# Patient Record
Sex: FEMALE | Race: ASIAN | NOT HISPANIC OR LATINO
[De-identification: names, ages, dates, MRNs, and addresses within clinical notes are randomized per-mention and may not be internally consistent; named-entity substitution may affect disease eponyms.]

---
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
## 2017-09-13 ENCOUNTER — CHARTING TRANS (OUTPATIENT)
Dept: OTHER | Age: 67
End: 2017-09-13

## 2017-12-11 ENCOUNTER — LAB SERVICES (OUTPATIENT)
Dept: OTHER | Age: 67
End: 2017-12-11

## 2017-12-12 ENCOUNTER — CHARTING TRANS (OUTPATIENT)
Dept: OTHER | Age: 67
End: 2017-12-12

## 2017-12-12 LAB
ALBUMIN SERPL-MCNC: 4.4 G/DL (ref 3.6–5.1)
ALBUMIN/GLOB SERPL: 1.2 (ref 1–2.4)
ALP SERPL-CCNC: 66 UNITS/L (ref 45–117)
ALT SERPL-CCNC: 22 UNITS/L
ANION GAP SERPL CALC-SCNC: 18 MMOL/L (ref 10–20)
AST SERPL-CCNC: 23 UNITS/L
BASOPHILS # BLD: 0 K/MCL (ref 0–0.3)
BASOPHILS NFR BLD: 0 %
BILIRUB SERPL-MCNC: 0.3 MG/DL (ref 0.2–1)
BUN SERPL-MCNC: 12 MG/DL (ref 6–20)
BUN/CREAT SERPL: 15 (ref 7–25)
CALCIUM SERPL-MCNC: 9.5 MG/DL (ref 8.4–10.2)
CHLORIDE SERPL-SCNC: 103 MMOL/L (ref 98–107)
CHOLEST SERPL-MCNC: 163 MG/DL
CHOLEST/HDLC SERPL: 1.9
CO2 SERPL-SCNC: 25 MMOL/L (ref 21–32)
CREAT SERPL-MCNC: 0.79 MG/DL (ref 0.51–0.95)
DIFFERENTIAL METHOD BLD: ABNORMAL
EOSINOPHIL # BLD: 0.3 K/MCL (ref 0.1–0.5)
EOSINOPHIL NFR BLD: 4 %
ERYTHROCYTE [DISTWIDTH] IN BLOOD: 14 % (ref 11–15)
GLOBULIN SER-MCNC: 3.8 G/DL (ref 2–4)
GLUCOSE SERPL-MCNC: 126 MG/DL (ref 65–99)
HBA1C MFR BLD: 7.6 % (ref 4.5–5.6)
HDLC SERPL-MCNC: 84 MG/DL
HEMATOCRIT: 42.1 % (ref 36–46.5)
HEMOGLOBIN: 12.8 G/DL (ref 12–15.5)
LDLC SERPL CALC-MCNC: 45 MG/DL
LENGTH OF FAST TIME PATIENT: ABNORMAL HRS
LENGTH OF FAST TIME PATIENT: ABNORMAL HRS
LYMPHOCYTES # BLD: 2.6 K/MCL (ref 1–4)
LYMPHOCYTES NFR BLD: 33 %
MEAN CORPUSCULAR HEMOGLOBIN: 27.1 PG (ref 26–34)
MEAN CORPUSCULAR HGB CONC: 30.4 G/DL (ref 32–36.5)
MEAN CORPUSCULAR VOLUME: 89.2 FL (ref 78–100)
MONOCYTES # BLD: 0.5 K/MCL (ref 0.3–0.9)
MONOCYTES NFR BLD: 6 %
NEUTROPHILS # BLD: 4.5 K/MCL (ref 1.8–7.7)
NEUTROPHILS NFR BLD: 57 %
NONHDLC SERPL-MCNC: 79 MG/DL
PLATELET COUNT: 263 K/MCL (ref 140–450)
POTASSIUM SERPL-SCNC: 4.5 MMOL/L (ref 3.4–5.1)
RED CELL COUNT: 4.72 MIL/MCL (ref 4–5.2)
SODIUM SERPL-SCNC: 141 MMOL/L (ref 135–145)
TOTAL PROTEIN: 8.2 G/DL (ref 6.4–8.2)
TRIGL SERPL-MCNC: 168 MG/DL
TSH SERPL-ACNC: 2.38 MCUNITS/ML (ref 0.35–5)
WHITE BLOOD COUNT: 7.9 K/MCL (ref 4.2–11)

## 2017-12-13 ENCOUNTER — CHARTING TRANS (OUTPATIENT)
Dept: OTHER | Age: 67
End: 2017-12-13

## 2017-12-13 LAB
CREATININE RANDOM URINE: 168 MG/DL
MICROALBUMIN UR-MCNC: 1.58 MG/DL
MICROALBUMIN/CREAT UR: 9.4 MCG/MG

## 2018-01-01 ENCOUNTER — EXTERNAL RECORD (OUTPATIENT)
Dept: HEALTH INFORMATION MANAGEMENT | Facility: OTHER | Age: 68
End: 2018-01-01

## 2018-11-02 VITALS
OXYGEN SATURATION: 99 % | TEMPERATURE: 98.2 F | HEART RATE: 97 BPM | HEIGHT: 59 IN | DIASTOLIC BLOOD PRESSURE: 68 MMHG | BODY MASS INDEX: 20.85 KG/M2 | WEIGHT: 103.44 LBS | SYSTOLIC BLOOD PRESSURE: 118 MMHG

## 2018-11-07 ENCOUNTER — LAB SERVICES (OUTPATIENT)
Dept: OTHER | Age: 68
End: 2018-11-07

## 2018-11-08 LAB
ANION GAP SERPL CALC-SCNC: 15 MMOL/L (ref 10–20)
BUN SERPL-MCNC: 11 MG/DL (ref 6–20)
BUN/CREAT SERPL: 15 (ref 7–25)
CALCIUM SERPL-MCNC: 9.6 MG/DL (ref 8.4–10.2)
CHLORIDE SERPL-SCNC: 104 MMOL/L (ref 98–107)
CHOLEST SERPL-MCNC: 153 MG/DL
CHOLEST/HDLC SERPL: 3.3
CO2 SERPL-SCNC: 26 MMOL/L (ref 21–32)
CREAT SERPL-MCNC: 0.75 MG/DL (ref 0.51–0.95)
CREATININE RANDOM URINE: 36.6 MG/DL
GLUCOSE SERPL-MCNC: 108 MG/DL (ref 65–99)
HBA1C MFR BLD: 6.9 % (ref 4.5–5.6)
HDLC SERPL-MCNC: 47 MG/DL
LDLC SERPL CALC-MCNC: 79 MG/DL
LENGTH OF FAST TIME PATIENT: ABNORMAL HRS
LENGTH OF FAST TIME PATIENT: ABNORMAL HRS
MICROALBUMIN UR-MCNC: <0.5 MG/DL
MICROALBUMIN/CREAT UR: NORMAL MG/G
NONHDLC SERPL-MCNC: 106 MG/DL
POTASSIUM SERPL-SCNC: 4.2 MMOL/L (ref 3.4–5.1)
SODIUM SERPL-SCNC: 141 MMOL/L (ref 135–145)
TRIGL SERPL-MCNC: 136 MG/DL
TSH SERPL-ACNC: 0.31 MCUNITS/ML (ref 0.35–5)

## 2018-11-16 ENCOUNTER — CHARTING TRANS (OUTPATIENT)
Dept: OTHER | Age: 68
End: 2018-11-16

## 2018-12-07 VITALS
BODY MASS INDEX: 20.88 KG/M2 | DIASTOLIC BLOOD PRESSURE: 68 MMHG | HEART RATE: 76 BPM | WEIGHT: 103.56 LBS | TEMPERATURE: 98.2 F | OXYGEN SATURATION: 98 % | HEIGHT: 59 IN | SYSTOLIC BLOOD PRESSURE: 116 MMHG

## 2018-12-14 RX ORDER — METFORMIN HYDROCHLORIDE 750 MG/1
TABLET, EXTENDED RELEASE ORAL
Qty: 180 TABLET | Refills: 0 | Status: SHIPPED | OUTPATIENT
Start: 2018-12-14 | End: 2018-12-17 | Stop reason: SDUPTHER

## 2018-12-17 RX ORDER — ATORVASTATIN CALCIUM 10 MG/1
TABLET, FILM COATED ORAL
Qty: 90 TABLET | Refills: 2 | Status: SHIPPED | OUTPATIENT
Start: 2018-12-17 | End: 2019-10-24 | Stop reason: SDUPTHER

## 2018-12-17 RX ORDER — METFORMIN HYDROCHLORIDE 750 MG/1
TABLET, EXTENDED RELEASE ORAL
Qty: 180 TABLET | Refills: 0 | Status: SHIPPED | OUTPATIENT
Start: 2018-12-17

## 2018-12-17 RX ORDER — LEVOTHYROXINE SODIUM 0.07 MG/1
TABLET ORAL
Qty: 90 TABLET | Refills: 2 | Status: SHIPPED | OUTPATIENT
Start: 2018-12-17 | End: 2019-05-28 | Stop reason: DRUGHIGH

## 2018-12-26 ENCOUNTER — OFFICE VISIT (OUTPATIENT)
Dept: FAMILY MEDICINE | Age: 68
End: 2018-12-26

## 2018-12-26 VITALS
BODY MASS INDEX: 20.76 KG/M2 | WEIGHT: 103 LBS | TEMPERATURE: 98 F | DIASTOLIC BLOOD PRESSURE: 70 MMHG | HEIGHT: 59 IN | HEART RATE: 68 BPM | OXYGEN SATURATION: 99 % | SYSTOLIC BLOOD PRESSURE: 124 MMHG

## 2018-12-26 DIAGNOSIS — R10.11 RUQ ABDOMINAL PAIN: Primary | ICD-10-CM

## 2018-12-26 LAB
ALBUMIN: 4.4 GM/DL (ref 3.5–5)
ALKALINE PHOSPHATASE: 53 U/L (ref 38–126)
ALT: 16 U/L (ref 4–34)
APPEARANCE, URINE: CLEAR
AST: 24 U/L (ref 14–40)
BASO #: 0.07 K/UL (ref 0–0.2)
BASO %: 1 % (ref 0–2)
BILIRUBIN TOTAL: 0.2 MG/DL (ref 0.2–1.3)
BILIRUBIN-URINE: NORMAL
BUN SERPL-MCNC: 11 MG/DL (ref 7–17)
CALCIUM: 10 MG/DL (ref 8.4–10.2)
CARBON DIOXIDE: 28 MMOL/L (ref 22–30)
CHLORIDE: 104 MMOL/L (ref 98–107)
COLOR, URINE: YELLOW
CREATININE: 0.69 MG/DL (ref 0.52–1.04)
EGFR FOR AFRICAN AMERICANS: >60
EGFR FOR NON-AFRICAN AMERICANS: >60
EOS #: 0.32 K/UL (ref 0–0.5)
EOS %: 4 % (ref 0–5)
GLUCOSE URINE-UA: NORMAL
GLUCOSE: 101 MG/DL (ref 70–99)
HEMATOCRIT: 37 % (ref 37–47)
HEMOGLOBIN: 12.2 GM/DL (ref 12–16)
KETONE-URINE: NORMAL
LIPASE: 118 U/L (ref 23–300)
LYMPH #: 2.32 K/UL (ref 1–4.8)
LYMPH %: 30 % (ref 22–44)
MEAN CORPUSCULAR HEMOGLOBIN: 28.6 PG/CELL (ref 27–35)
MEAN CORPUSCULAR HGB CONC: 33.1 G/DL (ref 32–36)
MEAN CORPUSCULAR VOLUME: 86.3 FL (ref 81–102)
MEAN PLATELET VOLUME: 10.4 FL (ref 6.7–10.4)
MONO #: 0.4 K/UL (ref 0–0.8)
MONO %: 5 % (ref 2–10)
NEUTROPHILS #: 4.55 K/UL (ref 1.8–7.7)
NEUTROPHILS %: 59 % (ref 40–70)
NITRITE-URINE: NORMAL
OCCULT BLOOD URINE: NORMAL
PH-URINE: 5 (ref 5–8)
PLATELET COUNT: 182 K/UL (ref 145–375)
POTASSIUM SERPL-SCNC: 4.8 MMOL/L (ref 3.5–5)
PROTEIN-URINE-DIP: NORMAL
RED CELL COUNT: 4.28 M/UL (ref 3.8–5.1)
RED CELL DISTRIBUTION WIDTH: 12.4 % (ref 11.5–14.5)
SODIUM: 141 MMOL/L (ref 136–145)
SPECIFIC GRAVITY-URINE: 1.02 (ref 1.01–1.03)
TOTAL PROTEIN: 7.4 GM/DL (ref 6.3–8.3)
URINE LEUKOCYTE ESTERASE: NORMAL
UROBILINOGEN-URINE: NORMAL MG/DL (ref 0.2–1)
WHITE BLOOD COUNT: 7.7 K/UL (ref 4.8–10.8)

## 2018-12-26 PROCEDURE — 99214 OFFICE O/P EST MOD 30 MIN: CPT | Performed by: FAMILY MEDICINE

## 2018-12-26 RX ORDER — TRAMADOL HYDROCHLORIDE 50 MG/1
50 TABLET ORAL DAILY PRN
Qty: 10 TABLET | Refills: 0 | Status: SHIPPED | OUTPATIENT
Start: 2018-12-26 | End: 2019-05-15 | Stop reason: ALTCHOICE

## 2018-12-26 SDOH — HEALTH STABILITY: MENTAL HEALTH: HOW OFTEN DO YOU HAVE A DRINK CONTAINING ALCOHOL?: NEVER

## 2018-12-26 SDOH — HEALTH STABILITY: MENTAL HEALTH
STRESS IS WHEN SOMEONE FEELS TENSE, NERVOUS, ANXIOUS, OR CAN'T SLEEP AT NIGHT BECAUSE THEIR MIND IS TROUBLED. HOW STRESSED ARE YOU?: NOT AT ALL

## 2018-12-26 SDOH — HEALTH STABILITY: PHYSICAL HEALTH: ON AVERAGE, HOW MANY DAYS PER WEEK DO YOU ENGAGE IN MODERATE TO STRENUOUS EXERCISE (LIKE A BRISK WALK)?: 0 DAYS

## 2018-12-31 ENCOUNTER — TELEPHONE (OUTPATIENT)
Dept: FAMILY MEDICINE | Age: 68
End: 2018-12-31

## 2018-12-31 DIAGNOSIS — R10.9 STOMACH PAIN: Primary | ICD-10-CM

## 2018-12-31 DIAGNOSIS — H92.01 RIGHT EAR PAIN: ICD-10-CM

## 2019-01-04 DIAGNOSIS — R10.11 RUQ ABDOMINAL PAIN: Primary | ICD-10-CM

## 2019-01-09 DIAGNOSIS — R10.11 RUQ ABDOMINAL PAIN: ICD-10-CM

## 2019-05-15 ENCOUNTER — OFFICE VISIT (OUTPATIENT)
Dept: FAMILY MEDICINE | Age: 69
End: 2019-05-15

## 2019-05-15 VITALS
BODY MASS INDEX: 19.25 KG/M2 | SYSTOLIC BLOOD PRESSURE: 118 MMHG | TEMPERATURE: 97.6 F | HEIGHT: 59 IN | HEART RATE: 81 BPM | OXYGEN SATURATION: 98 % | WEIGHT: 95.5 LBS | DIASTOLIC BLOOD PRESSURE: 60 MMHG | RESPIRATION RATE: 16 BRPM

## 2019-05-15 DIAGNOSIS — J01.90 ACUTE NON-RECURRENT SINUSITIS, UNSPECIFIED LOCATION: ICD-10-CM

## 2019-05-15 DIAGNOSIS — R63.4 WEIGHT LOSS, UNINTENTIONAL: Primary | ICD-10-CM

## 2019-05-15 PROBLEM — Z12.31 VISIT FOR SCREENING MAMMOGRAM: Status: ACTIVE | Noted: 2018-11-16

## 2019-05-15 PROBLEM — E11.9 TYPE 2 DIABETES MELLITUS WITHOUT COMPLICATION, WITHOUT LONG-TERM CURRENT USE OF INSULIN (CMD): Status: ACTIVE | Noted: 2018-02-15

## 2019-05-15 PROCEDURE — 99214 OFFICE O/P EST MOD 30 MIN: CPT | Performed by: FAMILY MEDICINE

## 2019-05-15 RX ORDER — AMOXICILLIN 875 MG/1
875 TABLET, COATED ORAL 2 TIMES DAILY
Qty: 14 TABLET | Refills: 0 | Status: SHIPPED | OUTPATIENT
Start: 2019-05-15 | End: 2019-05-22

## 2019-05-15 RX ORDER — BENZONATATE 200 MG/1
200 CAPSULE ORAL 3 TIMES DAILY PRN
Qty: 30 CAPSULE | Refills: 0 | Status: SHIPPED | OUTPATIENT
Start: 2019-05-15

## 2019-05-15 RX ORDER — FAMOTIDINE 20 MG/1
20 TABLET, FILM COATED ORAL 2 TIMES DAILY
Qty: 180 TABLET | Refills: 1 | Status: SHIPPED | OUTPATIENT
Start: 2019-05-15

## 2019-05-15 SDOH — HEALTH STABILITY: MENTAL HEALTH: HOW OFTEN DO YOU HAVE A DRINK CONTAINING ALCOHOL?: NEVER

## 2019-05-15 SDOH — HEALTH STABILITY: PHYSICAL HEALTH: ON AVERAGE, HOW MANY DAYS PER WEEK DO YOU ENGAGE IN MODERATE TO STRENUOUS EXERCISE (LIKE A BRISK WALK)?: 0 DAYS

## 2019-05-15 ASSESSMENT — PATIENT HEALTH QUESTIONNAIRE - PHQ9
SUM OF ALL RESPONSES TO PHQ9 QUESTIONS 1 AND 2: 0
SUM OF ALL RESPONSES TO PHQ9 QUESTIONS 1 AND 2: 0
1. LITTLE INTEREST OR PLEASURE IN DOING THINGS: NOT AT ALL
2. FEELING DOWN, DEPRESSED OR HOPELESS: NOT AT ALL

## 2019-05-22 ENCOUNTER — LAB SERVICES (OUTPATIENT)
Dept: LAB | Age: 69
End: 2019-05-22

## 2019-05-22 DIAGNOSIS — R63.4 WEIGHT LOSS, UNINTENTIONAL: ICD-10-CM

## 2019-05-23 LAB
ALBUMIN SERPL-MCNC: 4.1 G/DL (ref 3.6–5.1)
ALBUMIN/GLOB SERPL: 1.2 {RATIO} (ref 1–2.4)
ALP SERPL-CCNC: 61 UNITS/L (ref 45–117)
ALT SERPL-CCNC: 18 UNITS/L
ANA SER QL IA: NEGATIVE
ANION GAP SERPL CALC-SCNC: 11 MMOL/L (ref 10–20)
AST SERPL-CCNC: 17 UNITS/L
BASOPHILS # BLD AUTO: 0 K/MCL (ref 0–0.3)
BASOPHILS NFR BLD AUTO: 0 %
BILIRUB SERPL-MCNC: 0.3 MG/DL (ref 0.2–1)
BUN SERPL-MCNC: 9 MG/DL (ref 6–20)
BUN/CREAT SERPL: 14 (ref 7–25)
CALCIUM SERPL-MCNC: 9.6 MG/DL (ref 8.4–10.2)
CHLORIDE SERPL-SCNC: 109 MMOL/L (ref 98–107)
CO2 SERPL-SCNC: 27 MMOL/L (ref 21–32)
CREAT SERPL-MCNC: 0.65 MG/DL (ref 0.51–0.95)
CRP SERPL-MCNC: <0.3 MG/DL
DIFFERENTIAL METHOD BLD: ABNORMAL
EOSINOPHIL # BLD AUTO: 0.2 K/MCL (ref 0.1–0.5)
EOSINOPHIL NFR SPEC: 3 %
ERYTHROCYTE [DISTWIDTH] IN BLOOD: 13.4 % (ref 11–15)
FASTING STATUS PATIENT QL REPORTED: ABNORMAL HRS
GLOBULIN SER-MCNC: 3.4 G/DL (ref 2–4)
GLUCOSE SERPL-MCNC: 92 MG/DL (ref 65–99)
HCT VFR BLD CALC: 40.5 % (ref 36–46.5)
HGB BLD-MCNC: 12.3 G/DL (ref 12–15.5)
IMM GRANULOCYTES # BLD AUTO: 0 K/MCL (ref 0–0.2)
IMM GRANULOCYTES NFR BLD: 0 %
LIPASE SERPL-CCNC: 155 UNITS/L (ref 73–393)
LYMPHOCYTES # BLD MANUAL: 2.8 K/MCL (ref 1–4)
LYMPHOCYTES NFR BLD MANUAL: 35 %
MCH RBC QN AUTO: 28 PG (ref 26–34)
MCHC RBC AUTO-ENTMCNC: 30.4 G/DL (ref 32–36.5)
MCV RBC AUTO: 92.3 FL (ref 78–100)
MONOCYTES # BLD MANUAL: 0.4 K/MCL (ref 0.3–0.9)
MONOCYTES NFR BLD MANUAL: 5 %
NEUTROPHILS # BLD: 4.5 K/MCL (ref 1.8–7.7)
NEUTROPHILS NFR BLD AUTO: 57 %
NRBC BLD MANUAL-RTO: 0 /100 WBC
PLATELET # BLD: 238 K/MCL (ref 140–450)
POTASSIUM SERPL-SCNC: 4.3 MMOL/L (ref 3.4–5.1)
PROT SERPL-MCNC: 7.5 G/DL (ref 6.4–8.2)
RBC # BLD: 4.39 MIL/MCL (ref 4–5.2)
RHEUMATOID FACT SERPL-ACNC: <10 UNITS/ML
SODIUM SERPL-SCNC: 143 MMOL/L (ref 135–145)
T3FREE SERPL-MCNC: 2.8 PG/ML (ref 2.2–4)
T4 FREE SERPL-MCNC: 1.5 NG/DL (ref 0.8–1.5)
TSH SERPL-ACNC: 0.24 MCUNITS/ML (ref 0.35–5)
WBC # BLD: 8 K/MCL (ref 4.2–11)

## 2019-05-28 ENCOUNTER — TELEPHONE (OUTPATIENT)
Dept: FAMILY MEDICINE | Age: 69
End: 2019-05-28

## 2019-05-28 DIAGNOSIS — E03.9 HYPOTHYROIDISM, ADULT: Primary | ICD-10-CM

## 2019-05-28 RX ORDER — LEVOTHYROXINE SODIUM 0.05 MG/1
50 TABLET ORAL DAILY
Qty: 90 TABLET | Refills: 0 | Status: SHIPPED | OUTPATIENT
Start: 2019-05-28 | End: 2019-09-04 | Stop reason: SDUPTHER

## 2019-07-23 RX ORDER — LEVOTHYROXINE SODIUM 0.07 MG/1
TABLET ORAL
Qty: 90 TABLET | Refills: 0 | Status: SHIPPED | OUTPATIENT
Start: 2019-07-23 | End: 2019-09-04 | Stop reason: ALTCHOICE

## 2019-09-04 DIAGNOSIS — E03.9 HYPOTHYROIDISM, ADULT: ICD-10-CM

## 2019-09-04 RX ORDER — LEVOTHYROXINE SODIUM 0.05 MG/1
50 TABLET ORAL DAILY
Qty: 90 TABLET | Refills: 0 | Status: SHIPPED | OUTPATIENT
Start: 2019-09-04 | End: 2019-10-23 | Stop reason: SDUPTHER

## 2019-10-23 DIAGNOSIS — E03.9 HYPOTHYROIDISM, ADULT: ICD-10-CM

## 2019-10-23 RX ORDER — LEVOTHYROXINE SODIUM 0.05 MG/1
50 TABLET ORAL DAILY
Qty: 90 TABLET | Refills: 0 | Status: SHIPPED | OUTPATIENT
Start: 2019-10-23

## 2019-10-24 RX ORDER — ATORVASTATIN CALCIUM 10 MG/1
TABLET, FILM COATED ORAL
Qty: 90 TABLET | Refills: 2 | Status: SHIPPED | OUTPATIENT
Start: 2019-10-24

## 2020-03-18 DIAGNOSIS — E03.9 HYPOTHYROIDISM, ADULT: ICD-10-CM

## 2020-03-18 RX ORDER — METFORMIN HYDROCHLORIDE 750 MG/1
TABLET, EXTENDED RELEASE ORAL
Qty: 180 TABLET | Refills: 0 | OUTPATIENT
Start: 2020-03-18

## 2020-03-18 RX ORDER — LEVOTHYROXINE SODIUM 0.05 MG/1
50 TABLET ORAL DAILY
Qty: 90 TABLET | Refills: 0 | OUTPATIENT
Start: 2020-03-18

## 2022-09-08 VITALS
WEIGHT: 101.85 LBS | RESPIRATION RATE: 16 BRPM | TEMPERATURE: 97 F | HEART RATE: 68 BPM | SYSTOLIC BLOOD PRESSURE: 129 MMHG | OXYGEN SATURATION: 100 % | HEIGHT: 58 IN | DIASTOLIC BLOOD PRESSURE: 70 MMHG

## 2022-09-08 RX ORDER — MELOXICAM 15 MG/1
1 TABLET ORAL
Qty: 0 | Refills: 0 | DISCHARGE

## 2022-09-08 RX ORDER — DENOSUMAB 60 MG/ML
0 INJECTION SUBCUTANEOUS
Qty: 0 | Refills: 0 | DISCHARGE

## 2022-09-08 RX ORDER — POVIDONE-IODINE 5 %
1 AEROSOL (ML) TOPICAL ONCE
Refills: 0 | Status: DISCONTINUED | OUTPATIENT
Start: 2022-09-09 | End: 2022-09-09

## 2022-09-08 NOTE — ASU PATIENT PROFILE, ADULT - NSICDXPASTMEDICALHX_GEN_ALL_CORE_FT
PAST MEDICAL HISTORY:  Cervical spondylosis     DM (diabetes mellitus), type 2     GERD (gastroesophageal reflux disease)     HLD (hyperlipidemia)     Hypothyroidism     Osteoporosis

## 2022-09-08 NOTE — ASU PATIENT PROFILE, ADULT - FALL HARM RISK - UNIVERSAL INTERVENTIONS
Bed in lowest position, wheels locked, appropriate side rails in place/Call bell, personal items and telephone in reach/Instruct patient to call for assistance before getting out of bed or chair/Non-slip footwear when patient is out of bed/[place] to call system/Physically safe environment - no spills, clutter or unnecessary equipment/Purposeful Proactive Rounding/Room/bathroom lighting operational, light cord in reach

## 2022-09-09 ENCOUNTER — INPATIENT (INPATIENT)
Facility: HOSPITAL | Age: 72
LOS: 3 days | Discharge: HOME HEALTH | DRG: 473 | End: 2022-09-13
Attending: NEUROLOGICAL SURGERY | Admitting: NEUROLOGICAL SURGERY
Payer: MEDICARE

## 2022-09-09 DIAGNOSIS — Z98.890 OTHER SPECIFIED POSTPROCEDURAL STATES: Chronic | ICD-10-CM

## 2022-09-09 DIAGNOSIS — M47.812 SPONDYLOSIS WITHOUT MYELOPATHY OR RADICULOPATHY, CERVICAL REGION: ICD-10-CM

## 2022-09-09 LAB
BLD GP AB SCN SERPL QL: NEGATIVE — SIGNIFICANT CHANGE UP
GLUCOSE BLDC GLUCOMTR-MCNC: 119 MG/DL — HIGH (ref 70–99)
GLUCOSE BLDC GLUCOMTR-MCNC: 138 MG/DL — HIGH (ref 70–99)
GLUCOSE BLDC GLUCOMTR-MCNC: 165 MG/DL — HIGH (ref 70–99)
RH IG SCN BLD-IMP: POSITIVE — SIGNIFICANT CHANGE UP

## 2022-09-09 PROCEDURE — 99024 POSTOP FOLLOW-UP VISIT: CPT

## 2022-09-09 DEVICE — IMPLANTABLE DEVICE: Type: IMPLANTABLE DEVICE | Status: FUNCTIONAL

## 2022-09-09 DEVICE — SURGIFOAM PAD 8CM X 12.5CM X 10MM (100): Type: IMPLANTABLE DEVICE | Status: FUNCTIONAL

## 2022-09-09 RX ORDER — ENOXAPARIN SODIUM 100 MG/ML
40 INJECTION SUBCUTANEOUS EVERY 24 HOURS
Refills: 0 | Status: DISCONTINUED | OUTPATIENT
Start: 2022-09-09 | End: 2022-09-09

## 2022-09-09 RX ORDER — DEXTROSE 50 % IN WATER 50 %
12.5 SYRINGE (ML) INTRAVENOUS ONCE
Refills: 0 | Status: DISCONTINUED | OUTPATIENT
Start: 2022-09-09 | End: 2022-09-13

## 2022-09-09 RX ORDER — DEXTROSE 50 % IN WATER 50 %
15 SYRINGE (ML) INTRAVENOUS ONCE
Refills: 0 | Status: DISCONTINUED | OUTPATIENT
Start: 2022-09-09 | End: 2022-09-13

## 2022-09-09 RX ORDER — GABAPENTIN 400 MG/1
300 CAPSULE ORAL THREE TIMES A DAY
Refills: 0 | Status: DISCONTINUED | OUTPATIENT
Start: 2022-09-09 | End: 2022-09-09

## 2022-09-09 RX ORDER — APREPITANT 80 MG/1
40 CAPSULE ORAL ONCE
Refills: 0 | Status: COMPLETED | OUTPATIENT
Start: 2022-09-09 | End: 2022-09-09

## 2022-09-09 RX ORDER — ATORVASTATIN CALCIUM 80 MG/1
1 TABLET, FILM COATED ORAL
Qty: 0 | Refills: 0 | DISCHARGE

## 2022-09-09 RX ORDER — HYDROMORPHONE HYDROCHLORIDE 2 MG/ML
0.2 INJECTION INTRAMUSCULAR; INTRAVENOUS; SUBCUTANEOUS
Refills: 0 | Status: DISCONTINUED | OUTPATIENT
Start: 2022-09-09 | End: 2022-09-11

## 2022-09-09 RX ORDER — DEXTROSE 50 % IN WATER 50 %
25 SYRINGE (ML) INTRAVENOUS ONCE
Refills: 0 | Status: DISCONTINUED | OUTPATIENT
Start: 2022-09-09 | End: 2022-09-13

## 2022-09-09 RX ORDER — ACETAMINOPHEN 500 MG
650 TABLET ORAL EVERY 6 HOURS
Refills: 0 | Status: DISCONTINUED | OUTPATIENT
Start: 2022-09-09 | End: 2022-09-09

## 2022-09-09 RX ORDER — SODIUM CHLORIDE 9 MG/ML
1000 INJECTION, SOLUTION INTRAVENOUS
Refills: 0 | Status: DISCONTINUED | OUTPATIENT
Start: 2022-09-09 | End: 2022-09-13

## 2022-09-09 RX ORDER — SENNA PLUS 8.6 MG/1
2 TABLET ORAL AT BEDTIME
Refills: 0 | Status: DISCONTINUED | OUTPATIENT
Start: 2022-09-09 | End: 2022-09-13

## 2022-09-09 RX ORDER — METHOCARBAMOL 500 MG/1
500 TABLET, FILM COATED ORAL EVERY 8 HOURS
Refills: 0 | Status: DISCONTINUED | OUTPATIENT
Start: 2022-09-09 | End: 2022-09-12

## 2022-09-09 RX ORDER — ATORVASTATIN CALCIUM 80 MG/1
10 TABLET, FILM COATED ORAL AT BEDTIME
Refills: 0 | Status: DISCONTINUED | OUTPATIENT
Start: 2022-09-09 | End: 2022-09-09

## 2022-09-09 RX ORDER — LEVOTHYROXINE SODIUM 125 MCG
50 TABLET ORAL DAILY
Refills: 0 | Status: DISCONTINUED | OUTPATIENT
Start: 2022-09-09 | End: 2022-09-09

## 2022-09-09 RX ORDER — SODIUM CHLORIDE 9 MG/ML
1000 INJECTION, SOLUTION INTRAVENOUS
Refills: 0 | Status: DISCONTINUED | OUTPATIENT
Start: 2022-09-09 | End: 2022-09-10

## 2022-09-09 RX ORDER — CEFAZOLIN SODIUM 1 G
2000 VIAL (EA) INJECTION EVERY 8 HOURS
Refills: 0 | Status: COMPLETED | OUTPATIENT
Start: 2022-09-09 | End: 2022-09-10

## 2022-09-09 RX ORDER — GABAPENTIN 400 MG/1
300 CAPSULE ORAL EVERY 8 HOURS
Refills: 0 | Status: DISCONTINUED | OUTPATIENT
Start: 2022-09-09 | End: 2022-09-10

## 2022-09-09 RX ORDER — ACETAMINOPHEN 500 MG
650 TABLET ORAL EVERY 6 HOURS
Refills: 0 | Status: DISCONTINUED | OUTPATIENT
Start: 2022-09-09 | End: 2022-09-13

## 2022-09-09 RX ORDER — DENOSUMAB 60 MG/ML
0 INJECTION SUBCUTANEOUS
Qty: 0 | Refills: 0 | DISCHARGE

## 2022-09-09 RX ORDER — INSULIN LISPRO 100/ML
VIAL (ML) SUBCUTANEOUS
Refills: 0 | Status: DISCONTINUED | OUTPATIENT
Start: 2022-09-09 | End: 2022-09-13

## 2022-09-09 RX ORDER — METFORMIN HYDROCHLORIDE 850 MG/1
1 TABLET ORAL
Qty: 0 | Refills: 0 | DISCHARGE

## 2022-09-09 RX ORDER — ATORVASTATIN CALCIUM 80 MG/1
10 TABLET, FILM COATED ORAL AT BEDTIME
Refills: 0 | Status: DISCONTINUED | OUTPATIENT
Start: 2022-09-09 | End: 2022-09-13

## 2022-09-09 RX ORDER — LEVOTHYROXINE SODIUM 125 MCG
50 TABLET ORAL DAILY
Refills: 0 | Status: DISCONTINUED | OUTPATIENT
Start: 2022-09-09 | End: 2022-09-13

## 2022-09-09 RX ORDER — CHLORHEXIDINE GLUCONATE 213 G/1000ML
1 SOLUTION TOPICAL EVERY 12 HOURS
Refills: 0 | Status: DISCONTINUED | OUTPATIENT
Start: 2022-09-09 | End: 2022-09-09

## 2022-09-09 RX ORDER — PANTOPRAZOLE SODIUM 20 MG/1
40 TABLET, DELAYED RELEASE ORAL
Refills: 0 | Status: DISCONTINUED | OUTPATIENT
Start: 2022-09-09 | End: 2022-09-13

## 2022-09-09 RX ORDER — ONDANSETRON 8 MG/1
4 TABLET, FILM COATED ORAL DAILY
Refills: 0 | Status: DISCONTINUED | OUTPATIENT
Start: 2022-09-09 | End: 2022-09-11

## 2022-09-09 RX ORDER — DEXAMETHASONE 0.5 MG/5ML
4 ELIXIR ORAL EVERY 6 HOURS
Refills: 0 | Status: COMPLETED | OUTPATIENT
Start: 2022-09-09 | End: 2022-09-11

## 2022-09-09 RX ORDER — GLUCAGON INJECTION, SOLUTION 0.5 MG/.1ML
1 INJECTION, SOLUTION SUBCUTANEOUS ONCE
Refills: 0 | Status: DISCONTINUED | OUTPATIENT
Start: 2022-09-09 | End: 2022-09-13

## 2022-09-09 RX ORDER — PANTOPRAZOLE SODIUM 20 MG/1
40 TABLET, DELAYED RELEASE ORAL
Refills: 0 | Status: DISCONTINUED | OUTPATIENT
Start: 2022-09-09 | End: 2022-09-09

## 2022-09-09 RX ORDER — OXYCODONE HYDROCHLORIDE 5 MG/1
5 TABLET ORAL EVERY 4 HOURS
Refills: 0 | Status: DISCONTINUED | OUTPATIENT
Start: 2022-09-09 | End: 2022-09-10

## 2022-09-09 RX ORDER — CHOLECALCIFEROL (VITAMIN D3) 125 MCG
1 CAPSULE ORAL
Qty: 0 | Refills: 0 | DISCHARGE

## 2022-09-09 RX ORDER — ACETAMINOPHEN 500 MG
1000 TABLET ORAL ONCE
Refills: 0 | Status: COMPLETED | OUTPATIENT
Start: 2022-09-09 | End: 2022-09-09

## 2022-09-09 RX ORDER — OMEPRAZOLE 10 MG/1
1 CAPSULE, DELAYED RELEASE ORAL
Qty: 0 | Refills: 0 | DISCHARGE

## 2022-09-09 RX ORDER — LIDOCAINE 4 G/100G
1 CREAM TOPICAL EVERY 24 HOURS
Refills: 0 | Status: DISCONTINUED | OUTPATIENT
Start: 2022-09-09 | End: 2022-09-13

## 2022-09-09 RX ORDER — CELECOXIB 200 MG/1
200 CAPSULE ORAL ONCE
Refills: 0 | Status: COMPLETED | OUTPATIENT
Start: 2022-09-09 | End: 2022-09-09

## 2022-09-09 RX ORDER — LEVOTHYROXINE SODIUM 125 MCG
1 TABLET ORAL
Qty: 0 | Refills: 0 | DISCHARGE

## 2022-09-09 RX ORDER — ACETAMINOPHEN 500 MG
700 TABLET ORAL ONCE
Refills: 0 | Status: COMPLETED | OUTPATIENT
Start: 2022-09-09 | End: 2022-09-09

## 2022-09-09 RX ADMIN — OXYCODONE HYDROCHLORIDE 5 MILLIGRAM(S): 5 TABLET ORAL at 22:58

## 2022-09-09 RX ADMIN — Medication 280 MILLIGRAM(S): at 17:18

## 2022-09-09 RX ADMIN — Medication 4 MILLIGRAM(S): at 17:52

## 2022-09-09 RX ADMIN — OXYCODONE HYDROCHLORIDE 5 MILLIGRAM(S): 5 TABLET ORAL at 21:58

## 2022-09-09 RX ADMIN — ATORVASTATIN CALCIUM 10 MILLIGRAM(S): 80 TABLET, FILM COATED ORAL at 21:58

## 2022-09-09 RX ADMIN — APREPITANT 40 MILLIGRAM(S): 80 CAPSULE ORAL at 11:26

## 2022-09-09 RX ADMIN — HYDROMORPHONE HYDROCHLORIDE 0.2 MILLIGRAM(S): 2 INJECTION INTRAMUSCULAR; INTRAVENOUS; SUBCUTANEOUS at 16:19

## 2022-09-09 RX ADMIN — Medication 2: at 22:36

## 2022-09-09 RX ADMIN — Medication 4 MILLIGRAM(S): at 23:58

## 2022-09-09 RX ADMIN — LIDOCAINE 1 PATCH: 4 CREAM TOPICAL at 18:06

## 2022-09-09 RX ADMIN — Medication 100 MILLIGRAM(S): at 21:59

## 2022-09-09 RX ADMIN — METHOCARBAMOL 500 MILLIGRAM(S): 500 TABLET, FILM COATED ORAL at 21:58

## 2022-09-09 RX ADMIN — GABAPENTIN 300 MILLIGRAM(S): 400 CAPSULE ORAL at 21:58

## 2022-09-09 RX ADMIN — Medication 700 MILLIGRAM(S): at 18:00

## 2022-09-09 RX ADMIN — Medication 1000 MILLIGRAM(S): at 11:26

## 2022-09-09 RX ADMIN — CELECOXIB 200 MILLIGRAM(S): 200 CAPSULE ORAL at 11:25

## 2022-09-09 RX ADMIN — Medication 650 MILLIGRAM(S): at 23:58

## 2022-09-09 RX ADMIN — HYDROMORPHONE HYDROCHLORIDE 0.2 MILLIGRAM(S): 2 INJECTION INTRAMUSCULAR; INTRAVENOUS; SUBCUTANEOUS at 16:04

## 2022-09-09 NOTE — H&P ADULT - HISTORY OF PRESENT ILLNESS
72y F with PMH significant for DMII, hypothyroidism, HLD, GERD, presented for elective cervical spine surgery. Patient with chief complaint of neck pain and discomfort with radiation down bilateral upper extremities right worse than left, onset 10+ years ago, worse in the past year. Reported attempts at conservative management. Patient is right hand dominant. 72y F with PMH significant for DMII, hypothyroidism, HLD, GERD, presented for elective cervical spine surgery. Patient with chief complaint of neck pain and discomfort with radiation down bilateral upper extremities, right worse than left, onset 10+ years ago, worse in the past year. Reported attempts at conservative management. Patient is right hand dominant.

## 2022-09-09 NOTE — H&P ADULT - ASSESSMENT
72y F with PMH significant for DMII, hypothyroidism, HLD, GERD, presented for elective ACDF C6-7 on 9/9/22 w/ Dr Casillas.

## 2022-09-09 NOTE — H&P ADULT - NSHPSOCIALHISTORY_GEN_ALL_CORE
Lives in [place], [place]. Visiting daughter in [facility] for operation. Denied tobacco, alcohol, illicit drug use.

## 2022-09-09 NOTE — H&P ADULT - NSHPPHYSICALEXAM_GEN_ALL_CORE
Neuro Exam  Alert and oriented x4  No motor or sensory deficit  ROM at neck limited secondary to pain  No obvious cranial nerve deficit  Equal chest rise  Unlabored breathing  Ambulating independently without assistance

## 2022-09-09 NOTE — PROGRESS NOTE ADULT - SUBJECTIVE AND OBJECTIVE BOX
NEUROSURGERY POST OP NOTE:    POD# 0 S/P C6-C7 ACDF     S: Patient seen in PACU complaining of right arm and shoulder pain and 7/10 neck pain that is slightly improved with pain medications. Offered lidocaine patch for right shoulder.       T(C): 35.9 (09-09-22 @ 15:48), Max: 36 (09-09-22 @ 11:33)  HR: 54 (09-09-22 @ 16:48) (50 - 80)  BP: 128/59 (09-09-22 @ 16:48) (128/59 - 192/72)  RR: 20 (09-09-22 @ 16:48) (10 - 20)  SpO2: 100% (09-09-22 @ 16:48) (100% - 100%)      acetaminophen     Tablet .. 650 milliGRAM(s) Oral every 6 hours  dexAMETHasone  Injectable 4 milliGRAM(s) IV Push every 6 hours  dextrose 5%. 1000 milliLiter(s) IV Continuous <Continuous>  dextrose 5%. 1000 milliLiter(s) IV Continuous <Continuous>  dextrose 50% Injectable 25 Gram(s) IV Push once  dextrose 50% Injectable 12.5 Gram(s) IV Push once  dextrose 50% Injectable 25 Gram(s) IV Push once  dextrose Oral Gel 15 Gram(s) Oral once PRN  enoxaparin Injectable 40 milliGRAM(s) SubCutaneous every 24 hours  glucagon  Injectable 1 milliGRAM(s) IntraMuscular once  HYDROmorphone  Injectable 0.2 milliGRAM(s) IV Push every 15 minutes PRN  insulin lispro (ADMELOG) corrective regimen sliding scale   SubCutaneous Before meals and at bedtime  methocarbamol 500 milliGRAM(s) Oral every 8 hours  ondansetron    Tablet 4 milliGRAM(s) Oral daily PRN  oxyCODONE    IR 5 milliGRAM(s) Oral every 4 hours PRN  senna 2 Tablet(s) Oral at bedtime      RADIOLOGY:     Exam:  General: NAD, pt is comfortably sitting up in bed, on room air  HEENT: CN II-XII grossly intact, PERRL 3mm briskly reactive, EOMI b/l, face symmetric, tongue midline, neck FROM  Cardiovascular: RRR, normal S1 and S2   Respiratory: lungs CTAB, no wheezing, rhonchi, or crackles   GI: normoactive BS to auscultation, abd soft, NTND   Neuro: A&Ox3, No aphasia, speech clear, no dysmetria, no pronator drift. Follows commands.  ALCANTAR x4 spontaneously, 5/5 strength in all extremities throughout. SILT throughout   Extremities: distal pulses 2+ x4  Wound/incision: +Right anterior cervical neck incision with dressing in place, C/D/I  Drain: +DANIELE to thumbprint suction      72y F with PMH significant for DMII, hypothyroidism, HLD, GERD, with neck pain and radiation to bilateral upper extremities R>L now s/p C6-C7 ACDF 9/9/22.     PLAN  NEURO  - Neuro/vitals q4 hours  - Monitor DANIELE drain output   - soft collar for comfort  - Decadron 4q6 x 2 days  - Robaxin for muscle spasm  - Continue home gabapentin 300 TID   - no post op imaging   - lidocaine patch for shoulder PRN     PULM  - Ween nasal cannula as tolerated   - Incentive spirometry     CARDIO  - Normotensive BP goal  - continue atorvastatin 10mg daily     GI  - Diabetic diet, advance as tolerated  - bowel regimen   - protonix 40mg daily (home med)     RENAL  - IVL until tolerating PO   - remove walls and f/u TOV  - electrolyte repletion PRN     ENDO  - Insulin sliding scale  - f/u A1c  - monitor fingersticks   - levothyroxine 50mcg daily    HEME  - SCDs for DVT prophylaxis     ID  - perioperative ancef    Dispo: Telemetry status, full code, PT/OT pending, dispo pending, family updated at bedside.     Assessment and plan discussed with Dr. Casillas

## 2022-09-09 NOTE — H&P ADULT - PROBLEM SELECTOR PLAN 1
Plan for ACDF C6-7, possible other levels 72y F S/P ACDF C6-7 and DANIELE drain placement on 9/9/22 w/ Dr Casillas  Monitor DANIELE drain and discontinue when minimal  Monitor for development of hematoma at anterior cervical surgical site  Insulin sliding scale  Diabetic diet  DVT prophylaxis - SCDs and Levenox in AM  Physical therapy evaluation s/p ACDF on POD #1  Pain management in accordance with ERAS protocol  Soft cervical collar prn for comfort upon discharge

## 2022-09-10 LAB
A1C WITH ESTIMATED AVERAGE GLUCOSE RESULT: 7.3 % — HIGH (ref 4–5.6)
ALBUMIN SERPL ELPH-MCNC: 4 G/DL — SIGNIFICANT CHANGE UP (ref 3.3–5)
ALP SERPL-CCNC: 38 U/L — LOW (ref 40–120)
ALT FLD-CCNC: 15 U/L — SIGNIFICANT CHANGE UP (ref 10–45)
ANION GAP SERPL CALC-SCNC: 11 MMOL/L — SIGNIFICANT CHANGE UP (ref 5–17)
AST SERPL-CCNC: 23 U/L — SIGNIFICANT CHANGE UP (ref 10–40)
BILIRUB SERPL-MCNC: 0.2 MG/DL — SIGNIFICANT CHANGE UP (ref 0.2–1.2)
BUN SERPL-MCNC: 17 MG/DL — SIGNIFICANT CHANGE UP (ref 7–23)
CALCIUM SERPL-MCNC: 8.6 MG/DL — SIGNIFICANT CHANGE UP (ref 8.4–10.5)
CHLORIDE SERPL-SCNC: 100 MMOL/L — SIGNIFICANT CHANGE UP (ref 96–108)
CO2 SERPL-SCNC: 24 MMOL/L — SIGNIFICANT CHANGE UP (ref 22–31)
CREAT SERPL-MCNC: 0.74 MG/DL — SIGNIFICANT CHANGE UP (ref 0.5–1.3)
EGFR: 85 ML/MIN/1.73M2 — SIGNIFICANT CHANGE UP
ESTIMATED AVERAGE GLUCOSE: 163 MG/DL — HIGH (ref 68–114)
GLUCOSE BLDC GLUCOMTR-MCNC: 187 MG/DL — HIGH (ref 70–99)
GLUCOSE BLDC GLUCOMTR-MCNC: 208 MG/DL — HIGH (ref 70–99)
GLUCOSE BLDC GLUCOMTR-MCNC: 223 MG/DL — HIGH (ref 70–99)
GLUCOSE BLDC GLUCOMTR-MCNC: 264 MG/DL — HIGH (ref 70–99)
GLUCOSE SERPL-MCNC: 210 MG/DL — HIGH (ref 70–99)
HCT VFR BLD CALC: 36.4 % — SIGNIFICANT CHANGE UP (ref 34.5–45)
HCV AB S/CO SERPL IA: 0.03 S/CO — SIGNIFICANT CHANGE UP
HCV AB SERPL-IMP: SIGNIFICANT CHANGE UP
HGB BLD-MCNC: 11.9 G/DL — SIGNIFICANT CHANGE UP (ref 11.5–15.5)
MCHC RBC-ENTMCNC: 30.1 PG — SIGNIFICANT CHANGE UP (ref 27–34)
MCHC RBC-ENTMCNC: 32.7 GM/DL — SIGNIFICANT CHANGE UP (ref 32–36)
MCV RBC AUTO: 92.2 FL — SIGNIFICANT CHANGE UP (ref 80–100)
NRBC # BLD: 0 /100 WBCS — SIGNIFICANT CHANGE UP (ref 0–0)
PLATELET # BLD AUTO: 181 K/UL — SIGNIFICANT CHANGE UP (ref 150–400)
POTASSIUM SERPL-MCNC: 4.1 MMOL/L — SIGNIFICANT CHANGE UP (ref 3.5–5.3)
POTASSIUM SERPL-SCNC: 4.1 MMOL/L — SIGNIFICANT CHANGE UP (ref 3.5–5.3)
PROT SERPL-MCNC: 6.4 G/DL — SIGNIFICANT CHANGE UP (ref 6–8.3)
RBC # BLD: 3.95 M/UL — SIGNIFICANT CHANGE UP (ref 3.8–5.2)
RBC # FLD: 12.5 % — SIGNIFICANT CHANGE UP (ref 10.3–14.5)
SODIUM SERPL-SCNC: 135 MMOL/L — SIGNIFICANT CHANGE UP (ref 135–145)
WBC # BLD: 8.98 K/UL — SIGNIFICANT CHANGE UP (ref 3.8–10.5)
WBC # FLD AUTO: 8.98 K/UL — SIGNIFICANT CHANGE UP (ref 3.8–10.5)

## 2022-09-10 PROCEDURE — 99222 1ST HOSP IP/OBS MODERATE 55: CPT

## 2022-09-10 PROCEDURE — 99024 POSTOP FOLLOW-UP VISIT: CPT

## 2022-09-10 RX ORDER — OXYCODONE HYDROCHLORIDE 5 MG/1
5 TABLET ORAL EVERY 4 HOURS
Refills: 0 | Status: DISCONTINUED | OUTPATIENT
Start: 2022-09-10 | End: 2022-09-13

## 2022-09-10 RX ORDER — GABAPENTIN 400 MG/1
300 CAPSULE ORAL
Refills: 0 | Status: DISCONTINUED | OUTPATIENT
Start: 2022-09-11 | End: 2022-09-11

## 2022-09-10 RX ORDER — GABAPENTIN 400 MG/1
600 CAPSULE ORAL
Refills: 0 | Status: DISCONTINUED | OUTPATIENT
Start: 2022-09-10 | End: 2022-09-13

## 2022-09-10 RX ORDER — ENOXAPARIN SODIUM 100 MG/ML
40 INJECTION SUBCUTANEOUS EVERY 24 HOURS
Refills: 0 | Status: DISCONTINUED | OUTPATIENT
Start: 2022-09-10 | End: 2022-09-13

## 2022-09-10 RX ORDER — OXYCODONE HYDROCHLORIDE 5 MG/1
10 TABLET ORAL EVERY 6 HOURS
Refills: 0 | Status: DISCONTINUED | OUTPATIENT
Start: 2022-09-10 | End: 2022-09-13

## 2022-09-10 RX ADMIN — Medication 650 MILLIGRAM(S): at 01:14

## 2022-09-10 RX ADMIN — OXYCODONE HYDROCHLORIDE 10 MILLIGRAM(S): 5 TABLET ORAL at 16:40

## 2022-09-10 RX ADMIN — GABAPENTIN 300 MILLIGRAM(S): 400 CAPSULE ORAL at 05:20

## 2022-09-10 RX ADMIN — GABAPENTIN 300 MILLIGRAM(S): 400 CAPSULE ORAL at 13:44

## 2022-09-10 RX ADMIN — SENNA PLUS 2 TABLET(S): 8.6 TABLET ORAL at 18:30

## 2022-09-10 RX ADMIN — Medication 4 MILLIGRAM(S): at 13:51

## 2022-09-10 RX ADMIN — Medication 4 MILLIGRAM(S): at 19:35

## 2022-09-10 RX ADMIN — Medication 6: at 12:53

## 2022-09-10 RX ADMIN — OXYCODONE HYDROCHLORIDE 10 MILLIGRAM(S): 5 TABLET ORAL at 16:01

## 2022-09-10 RX ADMIN — METHOCARBAMOL 500 MILLIGRAM(S): 500 TABLET, FILM COATED ORAL at 13:44

## 2022-09-10 RX ADMIN — ATORVASTATIN CALCIUM 10 MILLIGRAM(S): 80 TABLET, FILM COATED ORAL at 22:16

## 2022-09-10 RX ADMIN — METHOCARBAMOL 500 MILLIGRAM(S): 500 TABLET, FILM COATED ORAL at 22:16

## 2022-09-10 RX ADMIN — Medication 650 MILLIGRAM(S): at 05:21

## 2022-09-10 RX ADMIN — PANTOPRAZOLE SODIUM 40 MILLIGRAM(S): 20 TABLET, DELAYED RELEASE ORAL at 05:20

## 2022-09-10 RX ADMIN — LIDOCAINE 1 PATCH: 4 CREAM TOPICAL at 06:11

## 2022-09-10 RX ADMIN — ENOXAPARIN SODIUM 40 MILLIGRAM(S): 100 INJECTION SUBCUTANEOUS at 22:16

## 2022-09-10 RX ADMIN — Medication 650 MILLIGRAM(S): at 19:39

## 2022-09-10 RX ADMIN — OXYCODONE HYDROCHLORIDE 5 MILLIGRAM(S): 5 TABLET ORAL at 11:45

## 2022-09-10 RX ADMIN — Medication 100 MILLIGRAM(S): at 06:24

## 2022-09-10 RX ADMIN — GABAPENTIN 600 MILLIGRAM(S): 400 CAPSULE ORAL at 22:18

## 2022-09-10 RX ADMIN — Medication 650 MILLIGRAM(S): at 13:44

## 2022-09-10 RX ADMIN — Medication 650 MILLIGRAM(S): at 00:58

## 2022-09-10 RX ADMIN — Medication 4 MILLIGRAM(S): at 05:21

## 2022-09-10 RX ADMIN — Medication 650 MILLIGRAM(S): at 06:21

## 2022-09-10 RX ADMIN — Medication 650 MILLIGRAM(S): at 20:39

## 2022-09-10 RX ADMIN — LIDOCAINE 1 PATCH: 4 CREAM TOPICAL at 18:30

## 2022-09-10 RX ADMIN — Medication 50 MICROGRAM(S): at 05:20

## 2022-09-10 RX ADMIN — Medication 4: at 22:14

## 2022-09-10 RX ADMIN — METHOCARBAMOL 500 MILLIGRAM(S): 500 TABLET, FILM COATED ORAL at 05:21

## 2022-09-10 RX ADMIN — LIDOCAINE 1 PATCH: 4 CREAM TOPICAL at 19:00

## 2022-09-10 RX ADMIN — Medication 4: at 18:01

## 2022-09-10 RX ADMIN — OXYCODONE HYDROCHLORIDE 5 MILLIGRAM(S): 5 TABLET ORAL at 11:11

## 2022-09-10 RX ADMIN — Medication 2: at 07:48

## 2022-09-10 NOTE — PHYSICAL THERAPY INITIAL EVALUATION ADULT - GENERAL OBSERVATIONS, REHAB EVAL
Pt received in bed in no acute distress, +SCD, EKG. complaints of pain LLE , R ankle 8/10, RN aware.

## 2022-09-10 NOTE — CONSULT NOTE ADULT - ASSESSMENT
per Neurosurgery    72 y o F with PMH significant for DMII, hypothyroidism, HLD, GERD, with neck pain and radiation to bilateral upper extremities R>L now s/p C6-C7 ACDF 9/9/22.     M50.20    Handoff    MEWS Score    Cervical spondylosis    DM (diabetes mellitus), type 2    HLD (hyperlipidemia)    GERD (gastroesophageal reflux disease)    Hypothyroidism    Osteoporosis    Neck pain    Neck pain    Cervical spondylosis    Anterior cervical discectomy and fusion (ACDF)    H/O exploratory laparotomy    [id number]        PLAN:  NEURO  - Neuro/vitals q4 hours  - Monitor DANIELE drain output   - soft collar for comfort  - Decadron 4q6 x 2 days  - Robaxin for muscle spasm  - Continue home gabapentin 300 TID   - no post op imaging   - lidocaine patch for shoulder PRN     PULM  - Ween nasal cannula as tolerated   - Incentive spirometry     CARDIO  - Normotensive BP goal  - continue atorvastatin 10mg daily     GI  - Diabetic diet, advance as tolerated  - bowel regimen   - protonix 40mg daily (home med)     RENAL  - IVL until tolerating PO   - remove walls and f/u TOV  - electrolyte repletion PRN     ENDO  - Insulin sliding scale  - f/u A1c  - monitor fingersticks   - levothyroxine 50mcg daily    HEME  - SCDs for DVT prophylaxis     ID  - perioperative ancef
72F w DM2 (7.3), hypothyroidism, HLD, GERD p/w neck pain w radiation through BUE R>L, s/p elective C6-C7 ACDF w Dr. Casillas 9/9     #Post-op state - pain controlled. On SCDs. On bowel regimen and incentive spirometer  #NIDDM2 - BGs 130-210. Likely elevated d/t decadron 4g q6 IV. On SSI  #HLD - c/w atorva 10  #Hypothyroidism - c/w synthroid 50  #GERD - c/w PPI  #Cervical radiculopathy - mgmt per NSG    Recommend  If BGs consistently > 200, would start lantus 6u qHS. Would transition pt back to outpatient DM regimen on DC  Increase nighttime gabapentin to 600mg which is pt's home dose    DISPO: Home no needs

## 2022-09-10 NOTE — DISCHARGE NOTE PROVIDER - NSDCCPTREATMENT_GEN_ALL_CORE_FT
PRINCIPAL PROCEDURE  Procedure: Anterior cervical discectomy and fusion (ACDF)  Findings and Treatment: C6-C7

## 2022-09-10 NOTE — PHYSICAL THERAPY INITIAL EVALUATION ADULT - PERTINENT HX OF CURRENT PROBLEM, REHAB EVAL
72y F with PMH significant for DMII, hypothyroidism, HLD, GERD, presented for elective cervical spine surgery. Patient with chief complaint of neck pain and discomfort with radiation down bilateral upper extremities, right worse than left, onset 10+ years ago, worse in the past year. Reported attempts at conservative management. Patient is right hand dominant.

## 2022-09-10 NOTE — DISCHARGE NOTE PROVIDER - NSDCMRMEDTOKEN_GEN_ALL_CORE_FT
atorvastatin 10 mg oral tablet: 1 tab(s) orally once a day  gabapentin 300 mg oral capsule: 1 cap(s) orally 3 times a day  levothyroxine 50 mcg (0.05 mg) oral tablet: 1 tab(s) orally once a day  meloxicam 15 mg oral tablet: 1 tab(s) orally once a day, As Needed  metFORMIN 500 mg oral tablet: 1 tab(s) orally 2 times a day  omeprazole 40 mg oral delayed release capsule: 1 cap(s) orally once a day  Prolia 60 mg/mL subcutaneous solution: subcutaneous every 6 months  Vitamin D3 50 mcg (2000 intl units) oral capsule: 1 cap(s) orally once a day   acetaminophen 325 mg oral tablet: 2 tab(s) orally every 6 hours  atorvastatin 10 mg oral tablet: 1 tab(s) orally once a day  gabapentin 300 mg oral capsule: 1 cap(s) orally 3 times a day  levothyroxine 50 mcg (0.05 mg) oral tablet: 1 tab(s) orally once a day  lidocaine 4% topical film: Apply topically to affected area every 24 hours   Medrol Dosepak 4 mg oral tablet: Take as prescribed on packaging   metFORMIN 500 mg oral tablet: 1 tab(s) orally 2 times a day  methocarbamol 500 mg oral tablet: 1 tab(s) orally every 8 hours MDD:3 tabs  omeprazole 40 mg oral delayed release capsule: 1 cap(s) orally once a day  oxyCODONE 5 mg oral tablet: 1 tab(s) orally every 6 hours, As Needed -Moderate Pain to severe pain MDD:4 tabs  Prolia 60 mg/mL subcutaneous solution: subcutaneous every 6 months  senna leaf extract oral tablet: 2 tab(s) orally once a day (at bedtime)  Vitamin D3 50 mcg (2000 intl units) oral capsule: 1 cap(s) orally once a day   acetaminophen 325 mg oral tablet: 2 tab(s) orally every 6 hours  atorvastatin 10 mg oral tablet: 1 tab(s) orally once a day  gabapentin 400 mg oral capsule: Take 1 capsule at 6AM and 2PM   Take 1.5 capsule at bedtime MDD:2.5  levothyroxine 50 mcg (0.05 mg) oral tablet: 1 tab(s) orally once a day  lidocaine 4% topical film: Apply topically to affected area every 24 hours   Medrol Dosepak 4 mg oral tablet: Take as prescribed on packaging   metFORMIN 500 mg oral tablet: 1 tab(s) orally 2 times a day  methocarbamol 500 mg oral tablet: 1 tab(s) orally every 8 hours MDD:3 tabs  methocarbamol 750 mg oral tablet: 1 tab(s) orally every 8 hours MDD:3 tabs  omeprazole 40 mg oral delayed release capsule: 1 cap(s) orally once a day  oxyCODONE 5 mg oral tablet: 1 tab(s) orally every 6 hours, As Needed -Moderate Pain to severe pain MDD:4 tabs  Prolia 60 mg/mL subcutaneous solution: subcutaneous every 6 months  senna leaf extract oral tablet: 2 tab(s) orally once a day (at bedtime)  Vitamin D3 50 mcg (2000 intl units) oral capsule: 1 cap(s) orally once a day   acetaminophen 325 mg oral tablet: 2 tab(s) orally every 6 hours  atorvastatin 10 mg oral tablet: 1 tab(s) orally once a day  gabapentin 400 mg oral capsule: Take 1 capsule at 6AM and 2PM   Take 1.5 capsule at bedtime MDD:2.5  gabapentin 400 mg oral capsule: 1 cap(s) orally 2 times a day at 6AM and 2PM   gabapentin 600 mg oral tablet: 1 tab(s) orally once a day (at bedtime)   levothyroxine 50 mcg (0.05 mg) oral tablet: 1 tab(s) orally once a day  lidocaine 4% topical film: Apply topically to affected area every 24 hours   Medrol Dosepak 4 mg oral tablet: Take as prescribed on packaging   metFORMIN 500 mg oral tablet: 1 tab(s) orally 2 times a day  methocarbamol 750 mg oral tablet: 1 tab(s) orally every 8 hours MDD:3 tabs  methylPREDNISolone 4 mg oral tablet: Take 6 tablets by mouth on day 1, 5 tablets by mouth on day 2, 4 tablets by mouth on day 3, 3 tablets by mouth on day 4, 2 tablets by mouth on day 5, 1 tablet by mouth on day 6, then stop.       omeprazole 40 mg oral delayed release capsule: 1 cap(s) orally once a day  oxyCODONE 5 mg oral tablet: 1 tab(s) orally every 6 hours, As Needed -Moderate Pain to severe pain MDD:4 tabs  Prolia 60 mg/mL subcutaneous solution: subcutaneous every 6 months  senna leaf extract oral tablet: 2 tab(s) orally once a day (at bedtime)  Vitamin D3 50 mcg (2000 intl units) oral capsule: 1 cap(s) orally once a day

## 2022-09-10 NOTE — DISCHARGE NOTE PROVIDER - HOSPITAL COURSE
HPI:72y F with PMH significant for DMII, hypothyroidism, HLD, GERD, presented for elective cervical spine surgery. Patient with chief complaint of neck pain and discomfort with radiation down bilateral upper extremities, right worse than left, onset 10+ years ago, worse in the past year. Reported attempts at conservative management. Patient is right hand dominant.    Hospital Course:   9/9: POD0 C6-C7 ACDF with post-operative right shoulder pain, lidocaine patch applied.  9/10: POD#1 s/p C6-C7 ACDF, JANAY overnight, neuro exam stable. DANIELE x1, f/u output, soft collar PRN. Cleared for home no needs by PT/OT      Patient evaluated by PT/OT who recommended: home no needs   Patient is going home    Hospital course c/b: nothing     Exam on day of discharge:  General: NAD, pt is comfortably sitting up in bed, A&O x3, on RA  HEENT: CN II-XII grossly intact, PERRL 3mm, EOMI b/l, face symmetric, tongue midline, neck FROM  Cardiovascular: RRR, normal S1 and S2   Respiratory: lungs CTAB, no wheezing, rhonchi, or crackles   GI: normoactive BS to auscultation, abd soft, NTND   Neuro: no aphasia, speech clear, no dysmetria, no pronator drift  strength 5/5 throughout all 4 extremities  sensation intact to light touch throughout   Extremities: distal pulses 2+ x4   Wound: ACDF incision with absorbable sutures in place     Checklist:   - Obtained follow up appointment from NP  - Reviewed final recommendations of inpatient consults  - Neurologically stable for discharge  - Vitals stable for discharge   - Afebrile for discharge  - WBC is stable  - Sodium level is normal  - Pain is adequately controlled  - Pt has PICC/walker/brace/collar        HPI:72y F with PMH significant for DMII, hypothyroidism, HLD, GERD, presented for elective cervical spine surgery. Patient with chief complaint of neck pain and discomfort with radiation down bilateral upper extremities, right worse than left, onset 10+ years ago, worse in the past year. Reported attempts at conservative management. Patient is right hand dominant.    Hospital Course:   9/9: POD0 C6-C7 ACDF with post-operative right shoulder pain, lidocaine patch applied.  9/10: POD#1 s/p C6-C7 ACDF, JANAY overnight, neuro exam stable. DANIELE x1, f/u output, soft collar PRN. Cleared for home no needs by PT/OT  9/11: POD#2 s/p C6-7 ACDF, JANAY overnight, no complaints regarding pain. DANIELE removed yesterday. Pend d/c home today no outpatient needs.       Patient evaluated by PT/OT who recommended: home no needs   Patient is going home    Hospital course c/b: nothing     Exam on day of discharge:  General: NAD, pt is comfortably sitting up in bed, A&O x3, on RA  HEENT: CN II-XII grossly intact, PERRL 3mm, EOMI b/l, face symmetric, tongue midline, neck FROM  Cardiovascular: RRR, normal S1 and S2   Respiratory: lungs CTAB, no wheezing, rhonchi, or crackles   GI: normoactive BS to auscultation, abd soft, NTND   Neuro: no aphasia, speech clear, no dysmetria, no pronator drift  strength 5/5 throughout all 4 extremities  sensation intact to light touch throughout   Extremities: distal pulses 2+ x4   Wound: ACDF incision with absorbable sutures in place     Checklist:   - Obtained follow up appointment from NP - [facility] office to call tomorrow  - Reviewed final recommendations of inpatient consults- none   - Neurologically stable for discharge - yes   - Vitals stable for discharge  - yes   - Afebrile for discharge - yes   - WBC is stable- 14, likley reactive and on steroids   - Sodium level is normal- yes 139   - Pain is adequately controlled - yes   - Pt has PICC/walker/brace/collar        HPI:72y F with PMH significant for DMII, hypothyroidism, HLD, GERD, presented for elective cervical spine surgery. Patient with chief complaint of neck pain and discomfort with radiation down bilateral upper extremities, right worse than left, onset 10+ years ago, worse in the past year. Reported attempts at conservative management. Patient is right hand dominant.    Hospital Course:   9/9: POD0 C6-C7 ACDF with post-operative right shoulder pain, lidocaine patch applied.  9/10: POD#1 s/p C6-C7 ACDF, JANAY overnight, neuro exam stable. DANIELE x1, f/u output, soft collar PRN. Cleared for home no needs by PT/OT  9/11: POD#2 s/p C6-7 ACDF, JANAY overnight, DANIELE removed yesterday. Complaining of epigastric pain given maalox. Persistent LE radicular type pain, increased gabapentin dosing. Planning for home tomorrow if improved effort with physical therapy. Started on lantus 8 units at bedtime.   9/12: POD3 C6-7 ACDF, JANAY, requiring further progress wtih PT  9/13: POD4 C6-7 ACDF, JANAY, passed with physical therapy. discharged to home today       Patient evaluated by PT/OT who recommended: home with home PT   Patient is going home    Hospital course c/b: lower extremity radicular pain, improved with gabapentin dosing increase     Exam on day of discharge:  General: NAD, pt is comfortably sitting up in bed, A&O x3, on RA  HEENT: CN II-XII grossly intact, PERRL 3mm, EOMI b/l, face symmetric, tongue midline, neck FROM  Cardiovascular: RRR, normal S1 and S2   Respiratory: lungs CTAB, no wheezing, rhonchi, or crackles   GI: normoactive BS to auscultation, abd soft, NTND   Neuro: no aphasia, speech clear, no dysmetria, no pronator drift  strength 5/5 throughout all 4 extremities  sensation intact to light touch throughout   Extremities: distal pulses 2+ x4   Wound: ACDF incision with absorbable sutures in place     Checklist:   - Obtained follow up appointment from NP - [facility] office to call tomorrow  - Reviewed final recommendations of inpatient consults- none   - Neurologically stable for discharge - yes   - Vitals stable for discharge  - yes   - Afebrile for discharge - yes   - WBC is stable- yes 9   - Sodium level is normal- yes 138  - Pain is adequately controlled - yes   - Pt has PICC/walker/brace/collar - walker

## 2022-09-10 NOTE — DISCHARGE NOTE PROVIDER - CARE PROVIDER_API CALL
Oli Casillas)  Neurosurgery  [address], [address]  [place] [address]  Phone: [phone]  Fax: [phone]  Follow Up Time:

## 2022-09-10 NOTE — CONSULT NOTE ADULT - SUBJECTIVE AND OBJECTIVE BOX
HPI "72y F with PMH significant for DMII, hypothyroidism, HLD, GERD, presented for elective cervical spine surgery. Patient with chief complaint of neck pain and discomfort with radiation down bilateral upper extremities, right worse than left, onset 10+ years ago, worse in the past year. Reported attempts at conservative management. Patient is right hand dominant. (09 Sep 2022 13:23)"    72F w DM2 (7.3), hypothyroidism, HLD, GERD p/w neck pain w radiation through BUE R>L, s/p elective C6-C7 ACDF w Dr. Casillas 9/9     Pt reports progressive neck pain w radiation into BUE R>L. This has improved after surgery. Pain controlled around neck incision. Eating wo issues, no dyspnea, coughing. No fever, chest pain, N/V/Abd pain. +voiding and passing BMs.     ROS: 12 point ROS reviewed and otherwise negative as per HPI  FH: No DVT/PE  SH: Non smoker      PAST MEDICAL & SURGICAL HISTORY:  Cervical spondylosis      DM (diabetes mellitus), type 2      HLD (hyperlipidemia)      GERD (gastroesophageal reflux disease)      Hypothyroidism      Osteoporosis      H/O exploratory laparotomy  endometriosis excision        Home Meds: Home Medications:  atorvastatin 10 mg oral tablet: 1 tab(s) orally once a day (09 Sep 2022 11:13)  gabapentin 300 mg oral capsule: 1 cap(s) orally 3 times a day (09 Sep 2022 11:13)  levothyroxine 50 mcg (0.05 mg) oral tablet: 1 tab(s) orally once a day (09 Sep 2022 11:13)  meloxicam 15 mg oral tablet: 1 tab(s) orally once a day, As Needed (09 Sep 2022 11:13)  metFORMIN 500 mg oral tablet: 1 tab(s) orally 2 times a day (09 Sep 2022 11:13)  omeprazole 40 mg oral delayed release capsule: 1 cap(s) orally once a day (09 Sep 2022 11:13)  Prolia 60 mg/mL subcutaneous solution: subcutaneous every 6 months (09 Sep 2022 11:13)  Vitamin D3 50 mcg (2000 intl units) oral capsule: 1 cap(s) orally once a day (09 Sep 2022 11:13)    Allergies: Allergies    No Known Allergies    Intolerances      Soc:   Advanced Directives: Presumed Full Code     CURRENT MEDICATIONS:   --------------------------------------------------------------------------------------  Neurologic Medications  acetaminophen     Tablet .. 650 milliGRAM(s) Oral every 6 hours  gabapentin 300 milliGRAM(s) Oral <User Schedule>  gabapentin 600 milliGRAM(s) Oral <User Schedule>  HYDROmorphone  Injectable 0.2 milliGRAM(s) IV Push every 15 minutes PRN Severe Pain (7 - 10)  methocarbamol 500 milliGRAM(s) Oral every 8 hours  ondansetron    Tablet 4 milliGRAM(s) Oral daily PRN Nausea and/or Vomiting  oxyCODONE    IR 10 milliGRAM(s) Oral every 6 hours PRN Severe Pain (7 - 10)  oxyCODONE    IR 5 milliGRAM(s) Oral every 4 hours PRN Moderate Pain (4 - 6)    Respiratory Medications    Cardiovascular Medications    Gastrointestinal Medications  dextrose 5%. 1000 milliLiter(s) IV Continuous <Continuous>  dextrose 5%. 1000 milliLiter(s) IV Continuous <Continuous>  pantoprazole    Tablet 40 milliGRAM(s) Oral before breakfast  senna 2 Tablet(s) Oral at bedtime    Genitourinary Medications    Hematologic/Oncologic Medications  enoxaparin Injectable 40 milliGRAM(s) SubCutaneous every 24 hours    Antimicrobial/Immunologic Medications    Endocrine/Metabolic Medications  atorvastatin 10 milliGRAM(s) Oral at bedtime  dexAMETHasone  Injectable 4 milliGRAM(s) IV Push every 6 hours  dextrose 50% Injectable 25 Gram(s) IV Push once  dextrose 50% Injectable 12.5 Gram(s) IV Push once  dextrose 50% Injectable 25 Gram(s) IV Push once  dextrose Oral Gel 15 Gram(s) Oral once PRN Blood Glucose LESS THAN 70 milliGRAM(s)/deciliter  glucagon  Injectable 1 milliGRAM(s) IntraMuscular once  insulin lispro (ADMELOG) corrective regimen sliding scale   SubCutaneous Before meals and at bedtime  levothyroxine 50 MICROGram(s) Oral daily    Topical/Other Medications  lidocaine   4% Patch 1 Patch Transdermal every 24 hours    --------------------------------------------------------------------------------------    VITAL SIGNS, INS/OUTS (last 24 hours):  --------------------------------------------------------------------------------------  ICU Vital Signs Last 24 Hrs  T(C): 36.6 (10 Sep 2022 20:20), Max: 36.6 (10 Sep 2022 15:04)  T(F): 97.8 (10 Sep 2022 20:20), Max: 97.9 (10 Sep 2022 15:04)  HR: 67 (10 Sep 2022 20:20) (52 - 99)  BP: 123/60 (10 Sep 2022 20:20) (98/53 - 145/67)  BP(mean): --  ABP: --  ABP(mean): --  RR: 17 (10 Sep 2022 20:20) (17 - 18)  SpO2: 98% (10 Sep 2022 20:20) (98% - 100%)    O2 Parameters below as of 10 Sep 2022 20:20  Patient On (Oxygen Delivery Method): room air          I&O's Summary    09 Sep 2022 07:01  -  10 Sep 2022 07:00  --------------------------------------------------------  IN: 600 mL / OUT: 505 mL / NET: 95 mL    10 Sep 2022 07:01  -  10 Sep 2022 23:00  --------------------------------------------------------  IN: 0 mL / OUT: 1200 mL / NET: -1200 mL      --------------------------------------------------------------------------------------    EXAM:  GEN: female in NAD on RA  HEENT: NC/AT, MMM, ant cervical dressing c/d/i  CV: RRR, nml S1S2, no murmurs  PULM: nml effort, CTAB  ABD: Soft, non-distended, NABS, non-tender  NEURO  A/O x3, moving all extremities, Sensation intact  BUE 5/5  PSYCH: Appropriate      LABS  --------------------------------------------------------------------------------------  Labs:  CAPILLARY BLOOD GLUCOSE      POCT Blood Glucose.: 223 mg/dL (10 Sep 2022 21:48)  POCT Blood Glucose.: 208 mg/dL (10 Sep 2022 17:05)  POCT Blood Glucose.: 264 mg/dL (10 Sep 2022 12:07)  POCT Blood Glucose.: 187 mg/dL (10 Sep 2022 07:44)                          11.9   8.98  )-----------( 181      ( 10 Sep 2022 07:09 )             36.4         09-10    135  |  100  |  17  ----------------------------<  210<H>  4.1   |  24  |  0.74      Calcium, Total Serum: 8.6 mg/dL (09-10-22 @ 07:09)      LFTs:             6.4  | 0.2  | 23       ------------------[38      ( 10 Sep 2022 07:09 )  4.0  | x    | 15          Lipase:x      Amylase:x             Coags:                  --------------------------------------------------------------------------------------    OTHER LABS    IMAGING RESULTS  ****************    
    Patient is a 72y old  Female who presents with a chief complaint of Anterior cervical diskectomy and fusion (10 Sep 2022 05:34)        HPI:  72 y o  F with PMH significant for DMII, hypothyroidism, HLD, GERD, presented for elective cervical spine surgery. Patient with chief complaint of neck pain and discomfort with radiation down bilateral upper extremities, right worse than left, onset 10+ years ago, worse in the past year. Reported attempts at conservative management. Patient is right hand dominant. (09 Sep 2022 13:23)      PAST MEDICAL & SURGICAL HISTORY:  Cervical spondylosis      DM (diabetes mellitus), type 2      HLD (hyperlipidemia)      GERD (gastroesophageal reflux disease)      Hypothyroidism      Osteoporosis      H/O exploratory laparotomy  endometriosis excision          MEDICATIONS  (STANDING):  acetaminophen     Tablet .. 650 milliGRAM(s) Oral every 6 hours  atorvastatin 10 milliGRAM(s) Oral at bedtime  dexAMETHasone  Injectable 4 milliGRAM(s) IV Push every 6 hours  dextrose 5%. 1000 milliLiter(s) (50 mL/Hr) IV Continuous <Continuous>  dextrose 5%. 1000 milliLiter(s) (100 mL/Hr) IV Continuous <Continuous>  dextrose 50% Injectable 25 Gram(s) IV Push once  dextrose 50% Injectable 12.5 Gram(s) IV Push once  dextrose 50% Injectable 25 Gram(s) IV Push once  gabapentin 300 milliGRAM(s) Oral every 8 hours  glucagon  Injectable 1 milliGRAM(s) IntraMuscular once  insulin lispro (ADMELOG) corrective regimen sliding scale   SubCutaneous Before meals and at bedtime  levothyroxine 50 MICROGram(s) Oral daily  lidocaine   4% Patch 1 Patch Transdermal every 24 hours  methocarbamol 500 milliGRAM(s) Oral every 8 hours  pantoprazole    Tablet 40 milliGRAM(s) Oral before breakfast  senna 2 Tablet(s) Oral at bedtime    MEDICATIONS  (PRN):  dextrose Oral Gel 15 Gram(s) Oral once PRN Blood Glucose LESS THAN 70 milliGRAM(s)/deciliter  HYDROmorphone  Injectable 0.2 milliGRAM(s) IV Push every 15 minutes PRN Severe Pain (7 - 10)  ondansetron    Tablet 4 milliGRAM(s) Oral daily PRN Nausea and/or Vomiting  oxyCODONE    IR 5 milliGRAM(s) Oral every 4 hours PRN Severe Pain (7 - 10)        Social History:                   -  Home Living Status :             -  Prior Home Care Services :             -  Family support :                               -  Occupation :     Baseline Functional Level Prior to Admission :             - ADL's/ IADL's :                    - ambulatory status :   walked with         FAMILY HISTORY:    CBC Full  -  ( 10 Sep 2022 07:09 )  WBC Count : 8.98 K/uL  RBC Count : 3.95 M/uL  Hemoglobin : 11.9 g/dL  Hematocrit : 36.4 %  Platelet Count - Automated : 181 K/uL  Mean Cell Volume : 92.2 fl  Mean Cell Hemoglobin : 30.1 pg  Mean Cell Hemoglobin Concentration : 32.7 gm/dL  Auto Neutrophil # : x  Auto Lymphocyte # : x  Auto Monocyte # : x  Auto Eosinophil # : x  Auto Basophil # : x  Auto Neutrophil % : x  Auto Lymphocyte % : x  Auto Monocyte % : x  Auto Eosinophil % : x  Auto Basophil % : x      09-10    135  |  100  |  17  ----------------------------<  210<H>  4.1   |  24  |  0.74    Ca    8.6      10 Sep 2022 07:09    TPro  6.4  /  Alb  4.0  /  TBili  0.2  /  DBili  x   /  AST  23  /  ALT  15  /  AlkPhos  38<L>  09-10            Radiology :                  Vital Signs Last 24 Hrs  T(C): 36.4 (10 Sep 2022 09:05), Max: 36.5 (09 Sep 2022 20:43)  T(F): 97.6 (10 Sep 2022 09:05), Max: 97.7 (09 Sep 2022 20:43)  HR: 68 (10 Sep 2022 09:05) (50 - 99)  BP: 122/68 (10 Sep 2022 09:05) (102/66 - 192/72)  BP(mean): 90 (09 Sep 2022 19:48) (79 - 127)  RR: 18 (10 Sep 2022 09:05) (10 - 20)  SpO2: 100% (10 Sep 2022 09:05) (98% - 100%)    Parameters below as of 10 Sep 2022 09:05  Patient On (Oxygen Delivery Method): room air            REVIEW OF SYSTEMS:  per HPI             Physical Exam: WDWN 72 y o woman lying in semi Nagy's position , awake , alert , no acute complaints       Neck : ACDF incision site with dressing C/D/I       Neurologic Exam:    Alert and oriented  x 3     Motor Exam:    Right UE:               no focal weakness ,  > 4/5 throughout                                      Left UE:                 no focal weakness ,  > 4/5 throughout         Right LE:                  no focal weakness ,  > 4/5 throughout       Left LE:                  no focal weakness ,  > 4/5 throughout        Sensation:         intact to light touch x 4 extremities                      DTR :                     biceps/brachioradialis : equal                                              patella/ankle : equal      neg clonus         Gait :  not tested              PM&R Impression :    s/p elective C6-C7 ACDF 9/9/22      Recommendations / Plan :     1) Physical / Occupational therapy focusing on therapeutic exercises , equipment evaluation , bed mobility/transfer out of bed evaluation , progressive ambulation with assistive devices prn .    2) Anticipated Disposition Plan / Recs  :   pending functional progress

## 2022-09-10 NOTE — PROGRESS NOTE ADULT - SUBJECTIVE AND OBJECTIVE BOX
HPI:  72y F with PMH significant for DMII, hypothyroidism, HLD, GERD, presented for elective cervical spine surgery. Patient with chief complaint of neck pain and discomfort with radiation down bilateral upper extremities, right worse than left, onset 10+ years ago, worse in the past year. Reported attempts at conservative management. Patient is right hand dominant. (09 Sep 2022 13:23)    HOSPITAL COURSE:   9/9: POD0 C6-C7 ACDF with post-operative right shoulder pain, lidocaine patch applied.  9/10: POD#1 s/p C6-C7 ACDF, JANAY overnight, neuro exam stable. DANIELE x1, f/u output, soft collar pRN. Pend PT/OT eval.        OVERNIGHT EVENTS:  Vital Signs Last 24 Hrs  T(C): 36.3 (10 Sep 2022 00:05), Max: 36.5 (09 Sep 2022 20:43)  T(F): 97.4 (10 Sep 2022 00:05), Max: 97.7 (09 Sep 2022 20:43)  HR: 52 (10 Sep 2022 00:05) (50 - 80)  BP: 102/66 (10 Sep 2022 00:05) (102/66 - 192/72)  BP(mean): 90 (09 Sep 2022 19:48) (79 - 127)  RR: 18 (10 Sep 2022 00:05) (10 - 20)  SpO2: 100% (10 Sep 2022 00:05) (98% - 100%)    Parameters below as of 10 Sep 2022 00:05  Patient On (Oxygen Delivery Method): room air        I&O's Summary    09 Sep 2022 07:01  -  10 Sep 2022 05:34  --------------------------------------------------------  IN: 0 mL / OUT: 350 mL / NET: -350 mL        PHYSICAL EXAM:  General: NAD, pt is comfortably sitting up in bed, A&O x3, on RA  HEENT: CN II-XII grossly intact, PERRL 3mm, EOMI b/l, face symmetric, tongue midline, neck FROM  Cardiovascular: RRR, normal S1 and S2   Respiratory: lungs CTAB, no wheezing, rhonchi, or crackles   GI: normoactive BS to auscultation, abd soft, NTND   Neuro: no aphasia, speech clear, no dysmetria, no pronator drift  strength 5/5 throughout all 4 extremities  sensation intact to light touch throughout   Extremities: distal pulses 2+ x4   Wound: ACDF incision site with dressing C/D/I     TUBES/LINES:  [] Walls  [] Lumbar Drain  [X] Wound Drains - DANIELE drain   [] Others    DIET:  [] NPO  [X] Mechanical  [] Tube feeds    LABS      CAPILLARY BLOOD GLUCOSE      POCT Blood Glucose.: 165 mg/dL (09 Sep 2022 22:26)  POCT Blood Glucose.: 138 mg/dL (09 Sep 2022 15:54)  POCT Blood Glucose.: 119 mg/dL (09 Sep 2022 10:53)      Drug Levels: [] N/A    CSF Analysis: [] N/A      Allergies    No Known Allergies    Intolerances      MEDICATIONS:  Antibiotics:  ceFAZolin   IVPB 2000 milliGRAM(s) IV Intermittent every 8 hours    Neuro:  acetaminophen     Tablet .. 650 milliGRAM(s) Oral every 6 hours  gabapentin 300 milliGRAM(s) Oral every 8 hours  HYDROmorphone  Injectable 0.2 milliGRAM(s) IV Push every 15 minutes PRN  methocarbamol 500 milliGRAM(s) Oral every 8 hours  ondansetron    Tablet 4 milliGRAM(s) Oral daily PRN  oxyCODONE    IR 5 milliGRAM(s) Oral every 4 hours PRN    Anticoagulation:    OTHER:  atorvastatin 10 milliGRAM(s) Oral at bedtime  dexAMETHasone  Injectable 4 milliGRAM(s) IV Push every 6 hours  dextrose 50% Injectable 25 Gram(s) IV Push once  dextrose 50% Injectable 12.5 Gram(s) IV Push once  dextrose 50% Injectable 25 Gram(s) IV Push once  dextrose Oral Gel 15 Gram(s) Oral once PRN  glucagon  Injectable 1 milliGRAM(s) IntraMuscular once  insulin lispro (ADMELOG) corrective regimen sliding scale   SubCutaneous Before meals and at bedtime  levothyroxine 50 MICROGram(s) Oral daily  lidocaine   4% Patch 1 Patch Transdermal every 24 hours  pantoprazole    Tablet 40 milliGRAM(s) Oral before breakfast  senna 2 Tablet(s) Oral at bedtime    IVF:  dextrose 5%. 1000 milliLiter(s) IV Continuous <Continuous>  dextrose 5%. 1000 milliLiter(s) IV Continuous <Continuous>  lactated ringers. 1000 milliLiter(s) IV Continuous <Continuous>    CULTURES:    RADIOLOGY & ADDITIONAL TESTS:      ASSESSMENT:  72y F with PMH significant for DMII, hypothyroidism, HLD, GERD, with neck pain and radiation to bilateral upper extremities R>L now s/p C6-C7 ACDF 9/9/22.     M50.20    Handoff    MEWS Score    Cervical spondylosis    DM (diabetes mellitus), type 2    HLD (hyperlipidemia)    GERD (gastroesophageal reflux disease)    Hypothyroidism    Osteoporosis    Neck pain    Neck pain    Cervical spondylosis    Anterior cervical discectomy and fusion (ACDF)    H/O exploratory laparotomy    SysAdmin_[email or web address]        PLAN:  NEURO  - Neuro/vitals q4 hours  - Monitor DANIELE drain output   - soft collar for comfort  - Decadron 4q6 x 2 days  - Robaxin for muscle spasm  - Continue home gabapentin 300 TID   - no post op imaging   - lidocaine patch for shoulder PRN     PULM  - Ween nasal cannula as tolerated   - Incentive spirometry     CARDIO  - Normotensive BP goal  - continue atorvastatin 10mg daily     GI  - Diabetic diet, advance as tolerated  - bowel regimen   - protonix 40mg daily (home med)     RENAL  - IVL until tolerating PO   - remove walls and f/u TOV  - electrolyte repletion PRN     ENDO  - Insulin sliding scale  - f/u A1c  - monitor fingersticks   - levothyroxine 50mcg daily    HEME  - SCDs for DVT prophylaxis     ID  - perioperative ancef    Dispo: Telemetry status, full code, PT/OT pending, dispo pending, family updated at bedside.     Assessment and plan discussed with Dr. Casillas

## 2022-09-10 NOTE — PROCEDURE NOTE - ADDITIONAL PROCEDURE DETAILS
R neck DANIELE site prepped w/ chlorhexidine, anchoring suture removed. R DANIELE drain was pulled w/ no complications. Pt tolerated procedure well

## 2022-09-10 NOTE — OCCUPATIONAL THERAPY INITIAL EVALUATION ADULT - NSOTDISCHREC_GEN_A_CORE
Home with assist for community IADLs from family if needed; per patient already has shower chair that  uses/No skilled OT needs

## 2022-09-10 NOTE — DISCHARGE NOTE PROVIDER - NSDCCPCAREPLAN_GEN_ALL_CORE_FT
PRINCIPAL DISCHARGE DIAGNOSIS  Diagnosis: Cervical spondylosis  Assessment and Plan of Treatment:       SECONDARY DISCHARGE DIAGNOSES  Diagnosis: Diabetes  Assessment and Plan of Treatment:     Diagnosis: Hypothyroid  Assessment and Plan of Treatment:     Diagnosis: HLD (hyperlipidemia)  Assessment and Plan of Treatment:     Diagnosis: GERD (gastroesophageal reflux disease)  Assessment and Plan of Treatment:     Diagnosis: Right shoulder pain  Assessment and Plan of Treatment:

## 2022-09-10 NOTE — OCCUPATIONAL THERAPY INITIAL EVALUATION ADULT - GENERAL OBSERVATIONS, REHAB EVAL
Chart review'd patient, RN ok'd patient for session, patient received supine in bed, +heplock +daughter bedside, NAD.

## 2022-09-10 NOTE — DISCHARGE NOTE PROVIDER - NSDCFUADDINST_GEN_ALL_CORE_FT
Neurosurgery follow up appointment date/time:  - You have absorbable sutures in place, they will dissolve on their own over time.   - please call the office to confirm appointment: [phone]    Wound Care:  - You can shower on POD5 (9/14), with warm soapy water and pat incision dry with clean towel  - No bathing or swimming   - Do not apply any creams, lotions or ointments to the incision.   - does dressing need to be changed/removed?    Devices:  - Soft collar for comfort as needed       Activity:  - fatigue is common after surgery, rest if you feel tired   - no bending, lifting, twisting or heavy lifting   - walking is recommended, ambulate as tolerated  - you may shower when you get home, keep your incision dry  - no bathing   - no driving within 24 hours of anesthesia or while taking prescription pain medications   - keep hydrated, drink plenty of water     Please also follow up with your primary care doctor.     Pain Expectations:  - pain after surgery is expected  - please take pain meds as prescribed   - Take tylenol 1-2 tabs every 6 hours as needed for mild to moderate pain  - Take oxycodone 5mg every 6 hours as needed for severe pain   - Take robaxin 500mg every 8 hours as needed for muscle spasm/tightness.     Medications:  - Continue home gabapentin 300mg every 8 hours  - Continue atorvastatin 10mg daily  - Continue metformin 500mg twice a day  - Continue omeprazole 40mg daily   - continue synthroid 50mcg daily   - continue prolia injection every 6 months  - Continue vitamin D daily   - adverse affects of meds discussed with patients  - pain medications can cause constipation, you should eat a high fiber diet and may take a stool softener while on pain meds   - Avoid taking Advil (ibuprofen), Motrin (naproxen), or Aspirin for pain as they can cause bleeding     Call the office or come to ED if:  - wound has drainage or bleeding, increased redness or pain at incision site, neurological change, fever (>101), chills, night sweats, syncope, nausea/vomiting      WITHIN 24 HOURS OF DISCHARGE, PLEASE CONTACT NEURO PA  WITH ANY QUESTIONS OR CONCERNS: [phone]   OTHERWISE, PLEASE CALL THE OFFICE WITH ANY QUESTIONS OR CONCERNS: [phone] Neurosurgery follow up appointment date/time:  - You have absorbable sutures in place, they will dissolve on their own over time.   - please call the office to confirm appointment: [phone]    Wound Care:  - You can shower on POD5 (9/14), with warm soapy water and pat incision dry with clean towel  - No bathing or swimming   - Do not apply any creams, lotions or ointments to the incision.     Devices:  - Soft collar for comfort as needed     Activity:  - fatigue is common after surgery, rest if you feel tired   - no bending, lifting, twisting or heavy lifting   - walking is recommended, ambulate as tolerated  - you may shower when you get home, keep your incision dry  - no bathing   - no driving within 24 hours of anesthesia or while taking prescription pain medications   - keep hydrated, drink plenty of water     Please also follow up with your primary care doctor.     Pain Expectations:  - pain after surgery is expected  - please take pain meds as prescribed   - Take tylenol 1-2 tabs every 6 hours as needed for mild to moderate pain  - Take oxycodone 5mg every 6 hours as needed for severe pain   - Take robaxin 500mg every 8 hours as needed for muscle spasm/tightness.     Medications:  - Take medrol dose el as prescribed on packaging   - Continue home gabapentin 300mg every 8 hours  - Continue atorvastatin 10mg daily  - Continue metformin 500mg twice a day  - Continue omeprazole 40mg daily   - continue synthroid 50mcg daily   - continue Prolia injection every 6 months  - Continue vitamin D daily   - adverse affects of meds discussed with patients  - pain medications can cause constipation, you should eat a high fiber diet and may take a stool softener while on pain meds   - Avoid taking Advil (ibuprofen), Motrin (naproxen), or Aspirin for pain as they can cause bleeding     Call the office or come to ED if:  - wound has drainage or bleeding, increased redness or pain at incision site, neurological change, fever (>101), chills, night sweats, syncope, nausea/vomiting      WITHIN 24 HOURS OF DISCHARGE, PLEASE CONTACT NEURO PA  WITH ANY QUESTIONS OR CONCERNS: [phone]   OTHERWISE, PLEASE CALL THE OFFICE WITH ANY QUESTIONS OR CONCERNS: [phone] Neurosurgery follow up appointment date/time:  - You have absorbable sutures in place, they will dissolve on their own over time.   - please call the office to confirm appointment: [phone]    Wound Care:  - You can shower on POD5 (9/14), with warm soapy water and pat incision dry with clean towel  - No bathing or swimming   - Do not apply any creams, lotions or ointments to the incision.     Devices:  - Soft collar for comfort as needed     Activity:  - fatigue is common after surgery, rest if you feel tired   - no bending, lifting, twisting or heavy lifting   - walking is recommended, ambulate as tolerated  - you may shower when you get home, keep your incision dry  - no bathing   - no driving within 24 hours of anesthesia or while taking prescription pain medications   - keep hydrated, drink plenty of water     Please also follow up with your primary care doctor.     Pain Expectations:  - pain after surgery is expected  - please take pain meds as prescribed   - Take tylenol 1-2 tabs every 6 hours as needed for mild to moderate pain  - Take oxycodone 5mg every 6 hours as needed for severe pain   - Take robaxin 750mg every 8 hours as needed for muscle spasm/tightness.     Medications:  - Take medrol dose el as prescribed on packaging   - Continue home gabapentin 400mg at 6AM and 2PM and 600mg at bedtime.   - Continue atorvastatin 10mg daily  - Continue metformin 500mg twice a day  - Continue omeprazole 40mg daily   - continue synthroid 50mcg daily   - continue Prolia injection every 6 months  - Continue vitamin D daily   - adverse affects of meds discussed with patients  - pain medications can cause constipation, you should eat a high fiber diet and may take a stool softener while on pain meds   - Avoid taking Advil (ibuprofen), Motrin (naproxen), or Aspirin for pain as they can cause bleeding     Call the office or come to ED if:  - wound has drainage or bleeding, increased redness or pain at incision site, neurological change, fever (>101), chills, night sweats, syncope, nausea/vomiting      WITHIN 24 HOURS OF DISCHARGE, PLEASE CONTACT NEURO PA  WITH ANY QUESTIONS OR CONCERNS: [phone]   OTHERWISE, PLEASE CALL THE OFFICE WITH ANY QUESTIONS OR CONCERNS: [phone] Neurosurgery follow up appointment date/time:  - You have absorbable sutures in place, they will dissolve on their own over time.   - please call the office to confirm appointment: [phone]    Wound Care:  - You can shower on POD5 (9/14), with warm soapy water and pat incision dry with clean towel  - No bathing or swimming   - Do not apply any creams, lotions or ointments to the incision.     Devices:  - Soft collar for comfort as needed     Activity:  - fatigue is common after surgery, rest if you feel tired   - no bending, lifting, twisting or heavy lifting   - walking is recommended, ambulate as tolerated  - you may shower when you get home, keep your incision dry  - no bathing   - no driving within 24 hours of anesthesia or while taking prescription pain medications   - keep hydrated, drink plenty of water     Please also follow up with your primary care doctor.     Pain Expectations:  - pain after surgery is expected  - please take pain meds as prescribed   - Take tylenol 1-2 tabs every 6 hours as needed for mild to moderate pain  - Take oxycodone 5mg every 6 hours as needed for severe pain   - Take robaxin 750mg every 8 hours as needed for muscle spasm/tightness.     Medications:  - Take methylprednisilone taper as written.  - Continue home gabapentin 400mg at 6AM and 2PM and 600mg at bedtime.   - Continue atorvastatin 10mg daily  - Continue metformin 500mg twice a day  - Continue omeprazole 40mg daily   - continue synthroid 50mcg daily   - continue Prolia injection every 6 months  - Continue vitamin D daily   - adverse affects of meds discussed with patients  - pain medications can cause constipation, you should eat a high fiber diet and may take a stool softener while on pain meds   - Avoid taking Advil (ibuprofen), Motrin (naproxen), or Aspirin for pain as they can cause bleeding     Call the office or come to ED if:  - wound has drainage or bleeding, increased redness or pain at incision site, neurological change, fever (>101), chills, night sweats, syncope, nausea/vomiting      WITHIN 24 HOURS OF DISCHARGE, PLEASE CONTACT NEURO PA  WITH ANY QUESTIONS OR CONCERNS: [phone]   OTHERWISE, PLEASE CALL THE OFFICE WITH ANY QUESTIONS OR CONCERNS: [phone]

## 2022-09-10 NOTE — OCCUPATIONAL THERAPY INITIAL EVALUATION ADULT - PERTINENT HX OF CURRENT PROBLEM, REHAB EVAL
72y F with PMH significant for DMII, hypothyroidism, HLD, GERD, presented for elective cervical spine surgery. Patient with chief complaint of neck pain and discomfort with radiation down bilateral upper extremities, right worse than left, onset 10+ years ago, worse in the past year. Reported attempts at conservative management. Patient is right hand dominant.
Color consistent with ethnicity/race, warm, dry intact, resilient.

## 2022-09-10 NOTE — PHYSICAL THERAPY INITIAL EVALUATION ADULT - CRITERIA FOR SKILLED THERAPEUTIC INTERVENTIONS
impairments found/functional limitations in following categories/therapy frequency/anticipated equipment needs at discharge/anticipated discharge recommendation

## 2022-09-10 NOTE — DISCHARGE NOTE PROVIDER - NSDCFUADDAPPT_GEN_ALL_CORE_FT
Please call [phone] to confirm your follow up appointment with Dr. Casillas    Please follow up with your primary care doctor.

## 2022-09-11 LAB
ANION GAP SERPL CALC-SCNC: 11 MMOL/L — SIGNIFICANT CHANGE UP (ref 5–17)
BUN SERPL-MCNC: 19 MG/DL — SIGNIFICANT CHANGE UP (ref 7–23)
CALCIUM SERPL-MCNC: 9 MG/DL — SIGNIFICANT CHANGE UP (ref 8.4–10.5)
CHLORIDE SERPL-SCNC: 105 MMOL/L — SIGNIFICANT CHANGE UP (ref 96–108)
CO2 SERPL-SCNC: 23 MMOL/L — SIGNIFICANT CHANGE UP (ref 22–31)
CREAT SERPL-MCNC: 0.74 MG/DL — SIGNIFICANT CHANGE UP (ref 0.5–1.3)
EGFR: 85 ML/MIN/1.73M2 — SIGNIFICANT CHANGE UP
GLUCOSE BLDC GLUCOMTR-MCNC: 206 MG/DL — HIGH (ref 70–99)
GLUCOSE BLDC GLUCOMTR-MCNC: 221 MG/DL — HIGH (ref 70–99)
GLUCOSE BLDC GLUCOMTR-MCNC: 226 MG/DL — HIGH (ref 70–99)
GLUCOSE BLDC GLUCOMTR-MCNC: 237 MG/DL — HIGH (ref 70–99)
GLUCOSE SERPL-MCNC: 229 MG/DL — HIGH (ref 70–99)
HCT VFR BLD CALC: 35.9 % — SIGNIFICANT CHANGE UP (ref 34.5–45)
HGB BLD-MCNC: 11.5 G/DL — SIGNIFICANT CHANGE UP (ref 11.5–15.5)
MAGNESIUM SERPL-MCNC: 2.2 MG/DL — SIGNIFICANT CHANGE UP (ref 1.6–2.6)
MCHC RBC-ENTMCNC: 29.9 PG — SIGNIFICANT CHANGE UP (ref 27–34)
MCHC RBC-ENTMCNC: 32 GM/DL — SIGNIFICANT CHANGE UP (ref 32–36)
MCV RBC AUTO: 93.2 FL — SIGNIFICANT CHANGE UP (ref 80–100)
NRBC # BLD: 0 /100 WBCS — SIGNIFICANT CHANGE UP (ref 0–0)
PHOSPHATE SERPL-MCNC: 2.6 MG/DL — SIGNIFICANT CHANGE UP (ref 2.5–4.5)
PLATELET # BLD AUTO: 192 K/UL — SIGNIFICANT CHANGE UP (ref 150–400)
POTASSIUM SERPL-MCNC: 4.4 MMOL/L — SIGNIFICANT CHANGE UP (ref 3.5–5.3)
POTASSIUM SERPL-SCNC: 4.4 MMOL/L — SIGNIFICANT CHANGE UP (ref 3.5–5.3)
RBC # BLD: 3.85 M/UL — SIGNIFICANT CHANGE UP (ref 3.8–5.2)
RBC # FLD: 13.2 % — SIGNIFICANT CHANGE UP (ref 10.3–14.5)
SODIUM SERPL-SCNC: 139 MMOL/L — SIGNIFICANT CHANGE UP (ref 135–145)
WBC # BLD: 14.39 K/UL — HIGH (ref 3.8–10.5)
WBC # FLD AUTO: 14.39 K/UL — HIGH (ref 3.8–10.5)

## 2022-09-11 PROCEDURE — 99024 POSTOP FOLLOW-UP VISIT: CPT

## 2022-09-11 RX ORDER — METHOCARBAMOL 500 MG/1
1 TABLET, FILM COATED ORAL
Qty: 42 | Refills: 0
Start: 2022-09-11 | End: 2022-09-24

## 2022-09-11 RX ORDER — LIDOCAINE 4 G/100G
1 CREAM TOPICAL
Qty: 7 | Refills: 0
Start: 2022-09-11 | End: 2022-09-17

## 2022-09-11 RX ORDER — MELOXICAM 15 MG/1
1 TABLET ORAL
Qty: 0 | Refills: 0 | DISCHARGE

## 2022-09-11 RX ORDER — GABAPENTIN 400 MG/1
400 CAPSULE ORAL
Refills: 0 | Status: DISCONTINUED | OUTPATIENT
Start: 2022-09-11 | End: 2022-09-13

## 2022-09-11 RX ORDER — POLYETHYLENE GLYCOL 3350 17 G/17G
17 POWDER, FOR SOLUTION ORAL EVERY 12 HOURS
Refills: 0 | Status: DISCONTINUED | OUTPATIENT
Start: 2022-09-11 | End: 2022-09-13

## 2022-09-11 RX ORDER — BENZOCAINE AND MENTHOL 5; 1 G/100ML; G/100ML
1 LIQUID ORAL EVERY 6 HOURS
Refills: 0 | Status: DISCONTINUED | OUTPATIENT
Start: 2022-09-11 | End: 2022-09-13

## 2022-09-11 RX ORDER — SENNA PLUS 8.6 MG/1
2 TABLET ORAL
Qty: 14 | Refills: 0
Start: 2022-09-11 | End: 2022-09-17

## 2022-09-11 RX ORDER — ONDANSETRON 8 MG/1
4 TABLET, FILM COATED ORAL EVERY 6 HOURS
Refills: 0 | Status: DISCONTINUED | OUTPATIENT
Start: 2022-09-11 | End: 2022-09-13

## 2022-09-11 RX ORDER — OXYCODONE HYDROCHLORIDE 5 MG/1
1 TABLET ORAL
Qty: 28 | Refills: 0
Start: 2022-09-11 | End: 2022-09-17

## 2022-09-11 RX ORDER — INSULIN GLARGINE 100 [IU]/ML
8 INJECTION, SOLUTION SUBCUTANEOUS AT BEDTIME
Refills: 0 | Status: DISCONTINUED | OUTPATIENT
Start: 2022-09-11 | End: 2022-09-13

## 2022-09-11 RX ORDER — ACETAMINOPHEN 500 MG
2 TABLET ORAL
Qty: 0 | Refills: 0 | DISCHARGE
Start: 2022-09-11

## 2022-09-11 RX ADMIN — METHOCARBAMOL 500 MILLIGRAM(S): 500 TABLET, FILM COATED ORAL at 21:49

## 2022-09-11 RX ADMIN — BENZOCAINE AND MENTHOL 1 LOZENGE: 5; 1 LIQUID ORAL at 13:34

## 2022-09-11 RX ADMIN — Medication 4 MILLIGRAM(S): at 06:31

## 2022-09-11 RX ADMIN — LIDOCAINE 1 PATCH: 4 CREAM TOPICAL at 19:10

## 2022-09-11 RX ADMIN — Medication 4 MILLIGRAM(S): at 00:42

## 2022-09-11 RX ADMIN — INSULIN GLARGINE 8 UNIT(S): 100 INJECTION, SOLUTION SUBCUTANEOUS at 21:50

## 2022-09-11 RX ADMIN — Medication 650 MILLIGRAM(S): at 06:32

## 2022-09-11 RX ADMIN — POLYETHYLENE GLYCOL 3350 17 GRAM(S): 17 POWDER, FOR SOLUTION ORAL at 21:51

## 2022-09-11 RX ADMIN — GABAPENTIN 300 MILLIGRAM(S): 400 CAPSULE ORAL at 06:34

## 2022-09-11 RX ADMIN — ATORVASTATIN CALCIUM 10 MILLIGRAM(S): 80 TABLET, FILM COATED ORAL at 21:49

## 2022-09-11 RX ADMIN — Medication 30 MILLILITER(S): at 11:36

## 2022-09-11 RX ADMIN — METHOCARBAMOL 500 MILLIGRAM(S): 500 TABLET, FILM COATED ORAL at 15:16

## 2022-09-11 RX ADMIN — Medication 4: at 17:08

## 2022-09-11 RX ADMIN — Medication 4: at 13:16

## 2022-09-11 RX ADMIN — Medication 650 MILLIGRAM(S): at 19:11

## 2022-09-11 RX ADMIN — OXYCODONE HYDROCHLORIDE 10 MILLIGRAM(S): 5 TABLET ORAL at 19:11

## 2022-09-11 RX ADMIN — Medication 4 MILLIGRAM(S): at 13:36

## 2022-09-11 RX ADMIN — GABAPENTIN 400 MILLIGRAM(S): 400 CAPSULE ORAL at 15:16

## 2022-09-11 RX ADMIN — POLYETHYLENE GLYCOL 3350 17 GRAM(S): 17 POWDER, FOR SOLUTION ORAL at 11:42

## 2022-09-11 RX ADMIN — OXYCODONE HYDROCHLORIDE 10 MILLIGRAM(S): 5 TABLET ORAL at 12:31

## 2022-09-11 RX ADMIN — SENNA PLUS 2 TABLET(S): 8.6 TABLET ORAL at 21:49

## 2022-09-11 RX ADMIN — Medication 4: at 21:51

## 2022-09-11 RX ADMIN — ENOXAPARIN SODIUM 40 MILLIGRAM(S): 100 INJECTION SUBCUTANEOUS at 21:50

## 2022-09-11 RX ADMIN — Medication 650 MILLIGRAM(S): at 00:43

## 2022-09-11 RX ADMIN — Medication 50 MICROGRAM(S): at 06:32

## 2022-09-11 RX ADMIN — PANTOPRAZOLE SODIUM 40 MILLIGRAM(S): 20 TABLET, DELAYED RELEASE ORAL at 06:31

## 2022-09-11 RX ADMIN — OXYCODONE HYDROCHLORIDE 10 MILLIGRAM(S): 5 TABLET ORAL at 11:36

## 2022-09-11 RX ADMIN — Medication 650 MILLIGRAM(S): at 01:43

## 2022-09-11 RX ADMIN — LIDOCAINE 1 PATCH: 4 CREAM TOPICAL at 06:40

## 2022-09-11 RX ADMIN — GABAPENTIN 600 MILLIGRAM(S): 400 CAPSULE ORAL at 21:50

## 2022-09-11 RX ADMIN — Medication 4: at 07:42

## 2022-09-11 RX ADMIN — BENZOCAINE AND MENTHOL 1 LOZENGE: 5; 1 LIQUID ORAL at 19:18

## 2022-09-11 RX ADMIN — Medication 650 MILLIGRAM(S): at 11:36

## 2022-09-11 RX ADMIN — Medication 650 MILLIGRAM(S): at 07:39

## 2022-09-11 RX ADMIN — METHOCARBAMOL 500 MILLIGRAM(S): 500 TABLET, FILM COATED ORAL at 06:31

## 2022-09-11 NOTE — CHART NOTE - NSCHARTNOTEFT_GEN_A_CORE
POD#2 s/p C6-7 ACDF, JANAY overnight, DANIELE removed yesterday. Complaining of epigastric pain given maalox. Persistent LE radicular type pain, increased gabapentin dosing. Planning for home tomorrow if improved effort with physical therapy. Started on lantus 8 units at bedtime.

## 2022-09-11 NOTE — PROGRESS NOTE ADULT - SUBJECTIVE AND OBJECTIVE BOX
HPI:  72y F with PMH significant for DMII, hypothyroidism, HLD, GERD, presented for elective cervical spine surgery. Patient with chief complaint of neck pain and discomfort with radiation down bilateral upper extremities, right worse than left, onset 10+ years ago, worse in the past year. Reported attempts at conservative management. Patient is right hand dominant. (09 Sep 2022 13:23)    HOSPITAL COURSE:       OVERNIGHT EVENTS:  Vital Signs Last 24 Hrs  T(C): 36.6 (11 Sep 2022 00:45), Max: 36.6 (10 Sep 2022 15:04)  T(F): 97.8 (11 Sep 2022 00:45), Max: 97.9 (10 Sep 2022 15:04)  HR: 62 (11 Sep 2022 00:45) (62 - 99)  BP: 118/57 (11 Sep 2022 00:45) (98/53 - 145/67)  BP(mean): --  RR: 18 (11 Sep 2022 00:45) (17 - 18)  SpO2: 98% (11 Sep 2022 00:45) (98% - 100%)    Parameters below as of 11 Sep 2022 00:45  Patient On (Oxygen Delivery Method): room air        I&O's Summary    09 Sep 2022 07:01  -  10 Sep 2022 07:00  --------------------------------------------------------  IN: 600 mL / OUT: 505 mL / NET: 95 mL    10 Sep 2022 07:01  -  11 Sep 2022 04:56  --------------------------------------------------------  IN: 0 mL / OUT: 1550 mL / NET: -1550 mL        PHYSICAL EXAM:  Neurological:    Motor exam:         [] Upper extremity              Bi(c5)  WE(c6)  EE(c7)   FF(c8)                                                R         5/5        5/5        5/5       5/5                                               L          5/5        5/5        5/5       5/5         [] Lower extremeity          HF(l2)   KE(l3)    TA(l4)   EHL(l5)  GS(s1)                                                 R        5/5        5/5        5/5       5/5         5/5                                               L         5/5        5/5       5/5       5/5          5/5                                                        [] warm well perfused; capillary refill <3 seconds     Sensation: [] intact to light touch  [] decreased:       Cardiovascular:  Respiratory:  Gastrointestinal:  Genitourinary:  Extremities:  Incision/Wound:    TUBES/LINES:  [] Syed  [] Lumbar Drain  [] Wound Drains  [] Others      DIET:  [] NPO  [] Mechanical  [] Tube feeds    LABS:                        11.9   8.98  )-----------( 181      ( 10 Sep 2022 07:09 )             36.4     09-10    135  |  100  |  17  ----------------------------<  210<H>  4.1   |  24  |  0.74    Ca    8.6      10 Sep 2022 07:09    TPro  6.4  /  Alb  4.0  /  TBili  0.2  /  DBili  x   /  AST  23  /  ALT  15  /  AlkPhos  38<L>  09-10            CAPILLARY BLOOD GLUCOSE      POCT Blood Glucose.: 223 mg/dL (10 Sep 2022 21:48)  POCT Blood Glucose.: 208 mg/dL (10 Sep 2022 17:05)  POCT Blood Glucose.: 264 mg/dL (10 Sep 2022 12:07)  POCT Blood Glucose.: 187 mg/dL (10 Sep 2022 07:44)      Drug Levels: [] N/A    CSF Analysis: [] N/A      Allergies    No Known Allergies    Intolerances      MEDICATIONS:  Antibiotics:    Neuro:  acetaminophen     Tablet .. 650 milliGRAM(s) Oral every 6 hours  gabapentin 300 milliGRAM(s) Oral <User Schedule>  gabapentin 600 milliGRAM(s) Oral <User Schedule>  HYDROmorphone  Injectable 0.2 milliGRAM(s) IV Push every 15 minutes PRN  methocarbamol 500 milliGRAM(s) Oral every 8 hours  ondansetron    Tablet 4 milliGRAM(s) Oral daily PRN  oxyCODONE    IR 5 milliGRAM(s) Oral every 4 hours PRN  oxyCODONE    IR 10 milliGRAM(s) Oral every 6 hours PRN    Anticoagulation:  enoxaparin Injectable 40 milliGRAM(s) SubCutaneous every 24 hours    OTHER:  atorvastatin 10 milliGRAM(s) Oral at bedtime  dexAMETHasone  Injectable 4 milliGRAM(s) IV Push every 6 hours  dextrose 50% Injectable 25 Gram(s) IV Push once  dextrose 50% Injectable 12.5 Gram(s) IV Push once  dextrose 50% Injectable 25 Gram(s) IV Push once  dextrose Oral Gel 15 Gram(s) Oral once PRN  glucagon  Injectable 1 milliGRAM(s) IntraMuscular once  insulin lispro (ADMELOG) corrective regimen sliding scale   SubCutaneous Before meals and at bedtime  levothyroxine 50 MICROGram(s) Oral daily  lidocaine   4% Patch 1 Patch Transdermal every 24 hours  pantoprazole    Tablet 40 milliGRAM(s) Oral before breakfast  senna 2 Tablet(s) Oral at bedtime    IVF:  dextrose 5%. 1000 milliLiter(s) IV Continuous <Continuous>  dextrose 5%. 1000 milliLiter(s) IV Continuous <Continuous>    CULTURES:    RADIOLOGY & ADDITIONAL TESTS:      ASSESSMENT:  72y Female s/p    M50.20    Handoff    MEWS Score    Cervical spondylosis    DM (diabetes mellitus), type 2    HLD (hyperlipidemia)    GERD (gastroesophageal reflux disease)    Hypothyroidism    Osteoporosis    Neck pain    Neck pain    Anterior cervical discectomy and fusion (ACDF)    Cervical spondylosis    Cervical spondylosis    Anterior cervical discectomy and fusion (ACDF)    H/O exploratory laparotomy    Diabetes    Hypothyroid    HLD (hyperlipidemia)    GERD (gastroesophageal reflux disease)    Right shoulder pain    SysAdmin_VstLnk        PLAN:  NEURO:    CARDIOVASCULAR:    PULMONARY:    RENAL:    GI:    HEME:    ID:    ENDO:    DVT PROPHYLAXIS:  [] Venodynes                                [] Heparin/Lovenox    DISPOSITION: HPI:  72y F with PMH significant for DMII, hypothyroidism, HLD, GERD, presented for elective cervical spine surgery. Patient with chief complaint of neck pain and discomfort with radiation down bilateral upper extremities, right worse than left, onset 10+ years ago, worse in the past year. Reported attempts at conservative management. Patient is right hand dominant. (09 Sep 2022 13:23)    HOSPITAL COURSE:   9/9: POD0 C6-C7 ACDF with post-operative right shoulder pain, lidocaine patch applied.  9/10: POD#1 s/p C6-C7 ACDF, JANAY overnight, neuro exam stable. DANIELE x1, f/u output, soft collar PRN. Cleared for home no needs by PT/OT.   9/11: POD#2 s/p C6-7 ACDF, JANAY overnight, no complaints regarding pain. DANIELE removed yesterday. Pend d/c home today no outpatient needs.       OVERNIGHT EVENTS:  Vital Signs Last 24 Hrs  T(C): 36.6 (11 Sep 2022 00:45), Max: 36.6 (10 Sep 2022 15:04)  T(F): 97.8 (11 Sep 2022 00:45), Max: 97.9 (10 Sep 2022 15:04)  HR: 62 (11 Sep 2022 00:45) (62 - 99)  BP: 118/57 (11 Sep 2022 00:45) (98/53 - 145/67)  BP(mean): --  RR: 18 (11 Sep 2022 00:45) (17 - 18)  SpO2: 98% (11 Sep 2022 00:45) (98% - 100%)    Parameters below as of 11 Sep 2022 00:45  Patient On (Oxygen Delivery Method): room air        I&O's Summary    09 Sep 2022 07:01  -  10 Sep 2022 07:00  --------------------------------------------------------  IN: 600 mL / OUT: 505 mL / NET: 95 mL    10 Sep 2022 07:01  -  11 Sep 2022 04:56  --------------------------------------------------------  IN: 0 mL / OUT: 1550 mL / NET: -1550 mL        PHYSICAL EXAM:  General: NAD, pt is comfortably sitting up in bed, A&O x3, on RA  HEENT: CN II-XII grossly intact, PERRL 3mm, EOMI b/l, face symmetric, tongue midline, neck FROM  Cardiovascular: RRR, normal S1 and S2   Respiratory: lungs CTAB, no wheezing, rhonchi, or crackles   GI: normoactive BS to auscultation, abd soft, NTND   Neuro: no aphasia, speech clear, no dysmetria, no pronator drift  strength 5/5 throughout all 4 extremities  sensation intact to light touch throughout   Extremities: distal pulses 2+ x4   Wound: ACDF incision site C/D/I     TUBES/LINES:  [] Syed  [] Lumbar Drain  [] Wound Drains   [] Others    DIET:  [] NPO  [X] Mechanical  [] Tube feeds      LABS:                        11.9   8.98  )-----------( 181      ( 10 Sep 2022 07:09 )             36.4     09-10    135  |  100  |  17  ----------------------------<  210<H>  4.1   |  24  |  0.74    Ca    8.6      10 Sep 2022 07:09    TPro  6.4  /  Alb  4.0  /  TBili  0.2  /  DBili  x   /  AST  23  /  ALT  15  /  AlkPhos  38<L>  09-10            CAPILLARY BLOOD GLUCOSE      POCT Blood Glucose.: 223 mg/dL (10 Sep 2022 21:48)  POCT Blood Glucose.: 208 mg/dL (10 Sep 2022 17:05)  POCT Blood Glucose.: 264 mg/dL (10 Sep 2022 12:07)  POCT Blood Glucose.: 187 mg/dL (10 Sep 2022 07:44)      Drug Levels: [] N/A    CSF Analysis: [] N/A      Allergies    No Known Allergies    Intolerances      MEDICATIONS:  Antibiotics:    Neuro:  acetaminophen     Tablet .. 650 milliGRAM(s) Oral every 6 hours  gabapentin 300 milliGRAM(s) Oral <User Schedule>  gabapentin 600 milliGRAM(s) Oral <User Schedule>  HYDROmorphone  Injectable 0.2 milliGRAM(s) IV Push every 15 minutes PRN  methocarbamol 500 milliGRAM(s) Oral every 8 hours  ondansetron    Tablet 4 milliGRAM(s) Oral daily PRN  oxyCODONE    IR 5 milliGRAM(s) Oral every 4 hours PRN  oxyCODONE    IR 10 milliGRAM(s) Oral every 6 hours PRN    Anticoagulation:  enoxaparin Injectable 40 milliGRAM(s) SubCutaneous every 24 hours    OTHER:  atorvastatin 10 milliGRAM(s) Oral at bedtime  dexAMETHasone  Injectable 4 milliGRAM(s) IV Push every 6 hours  dextrose 50% Injectable 25 Gram(s) IV Push once  dextrose 50% Injectable 12.5 Gram(s) IV Push once  dextrose 50% Injectable 25 Gram(s) IV Push once  dextrose Oral Gel 15 Gram(s) Oral once PRN  glucagon  Injectable 1 milliGRAM(s) IntraMuscular once  insulin lispro (ADMELOG) corrective regimen sliding scale   SubCutaneous Before meals and at bedtime  levothyroxine 50 MICROGram(s) Oral daily  lidocaine   4% Patch 1 Patch Transdermal every 24 hours  pantoprazole    Tablet 40 milliGRAM(s) Oral before breakfast  senna 2 Tablet(s) Oral at bedtime    IVF:  dextrose 5%. 1000 milliLiter(s) IV Continuous <Continuous>  dextrose 5%. 1000 milliLiter(s) IV Continuous <Continuous>    CULTURES:    RADIOLOGY & ADDITIONAL TESTS:      ASSESSMENT:  72y F with PMH significant for DMII, hypothyroidism, HLD, GERD, with neck pain and radiation to bilateral upper extremities R>L now s/p C6-C7 ACDF 9/9/22.     M50.20    Handoff    MEWS Score    Cervical spondylosis    DM (diabetes mellitus), type 2    HLD (hyperlipidemia)    GERD (gastroesophageal reflux disease)    Hypothyroidism    Osteoporosis    Neck pain    Neck pain    Anterior cervical discectomy and fusion (ACDF)    Cervical spondylosis    Cervical spondylosis    Anterior cervical discectomy and fusion (ACDF)    H/O exploratory laparotomy    Diabetes    Hypothyroid    HLD (hyperlipidemia)    GERD (gastroesophageal reflux disease)    Right shoulder pain    SysAdmin_VstLnk        PLAN:  NEURO  - Neuro/vitals q4 hours  - DANIELE d/c'd 9/10   - soft collar PRN for comfort  - Decadron 4q6 x 2 days  - Robaxin for muscle spasm  - Continue home gabapentin 300 TID   - no post op imaging   - lidocaine patch for shoulder PRN     PULM  - Ween nasal cannula as tolerated   - Incentive spirometry     CARDIO  - Normotensive BP goal  - continue atorvastatin 10mg daily     GI  - Diabetic diet, advance as tolerated  - bowel regimen   - protonix 40mg daily (home med)     RENAL  - IVL until tolerating PO   - electrolyte repletion PRN     ENDO  - monitor fingersticks   - A1c 7.3, cont ISS    - levothyroxine 50mcg daily    HEME  - SCDs and SQL 40mg QD for DVT ppx     ID  - perioperative ancef off     Dispo: Telemetry status, full code, pend home no needs, family updated at bedside.     Assessment and plan discussed with Dr. Casillas

## 2022-09-12 DIAGNOSIS — E11.9 TYPE 2 DIABETES MELLITUS WITHOUT COMPLICATIONS: ICD-10-CM

## 2022-09-12 DIAGNOSIS — E03.9 HYPOTHYROIDISM, UNSPECIFIED: ICD-10-CM

## 2022-09-12 DIAGNOSIS — K21.9 GASTRO-ESOPHAGEAL REFLUX DISEASE WITHOUT ESOPHAGITIS: ICD-10-CM

## 2022-09-12 DIAGNOSIS — E78.5 HYPERLIPIDEMIA, UNSPECIFIED: ICD-10-CM

## 2022-09-12 LAB
APPEARANCE UR: CLEAR — SIGNIFICANT CHANGE UP
BILIRUB UR-MCNC: NEGATIVE — SIGNIFICANT CHANGE UP
COLOR SPEC: YELLOW — SIGNIFICANT CHANGE UP
DIFF PNL FLD: NEGATIVE — SIGNIFICANT CHANGE UP
GLUCOSE BLDC GLUCOMTR-MCNC: 142 MG/DL — HIGH (ref 70–99)
GLUCOSE BLDC GLUCOMTR-MCNC: 147 MG/DL — HIGH (ref 70–99)
GLUCOSE BLDC GLUCOMTR-MCNC: 158 MG/DL — HIGH (ref 70–99)
GLUCOSE BLDC GLUCOMTR-MCNC: 158 MG/DL — HIGH (ref 70–99)
GLUCOSE UR QL: 100
KETONES UR-MCNC: NEGATIVE — SIGNIFICANT CHANGE UP
LEUKOCYTE ESTERASE UR-ACNC: NEGATIVE — SIGNIFICANT CHANGE UP
NITRITE UR-MCNC: NEGATIVE — SIGNIFICANT CHANGE UP
PH UR: 6 — SIGNIFICANT CHANGE UP (ref 5–8)
PROT UR-MCNC: NEGATIVE MG/DL — SIGNIFICANT CHANGE UP
SP GR SPEC: <=1.005 — SIGNIFICANT CHANGE UP (ref 1–1.03)
UROBILINOGEN FLD QL: 0.2 E.U./DL — SIGNIFICANT CHANGE UP

## 2022-09-12 PROCEDURE — 99233 SBSQ HOSP IP/OBS HIGH 50: CPT

## 2022-09-12 PROCEDURE — 99024 POSTOP FOLLOW-UP VISIT: CPT

## 2022-09-12 RX ORDER — LACTULOSE 10 G/15ML
20 SOLUTION ORAL ONCE
Refills: 0 | Status: COMPLETED | OUTPATIENT
Start: 2022-09-12 | End: 2022-09-12

## 2022-09-12 RX ORDER — METHOCARBAMOL 500 MG/1
750 TABLET, FILM COATED ORAL EVERY 8 HOURS
Refills: 0 | Status: DISCONTINUED | OUTPATIENT
Start: 2022-09-12 | End: 2022-09-13

## 2022-09-12 RX ADMIN — SENNA PLUS 2 TABLET(S): 8.6 TABLET ORAL at 22:40

## 2022-09-12 RX ADMIN — LIDOCAINE 1 PATCH: 4 CREAM TOPICAL at 18:03

## 2022-09-12 RX ADMIN — LACTULOSE 20 GRAM(S): 10 SOLUTION ORAL at 15:11

## 2022-09-12 RX ADMIN — Medication 650 MILLIGRAM(S): at 00:35

## 2022-09-12 RX ADMIN — GABAPENTIN 400 MILLIGRAM(S): 400 CAPSULE ORAL at 06:24

## 2022-09-12 RX ADMIN — Medication 30 MILLILITER(S): at 22:39

## 2022-09-12 RX ADMIN — ATORVASTATIN CALCIUM 10 MILLIGRAM(S): 80 TABLET, FILM COATED ORAL at 22:40

## 2022-09-12 RX ADMIN — Medication 650 MILLIGRAM(S): at 01:35

## 2022-09-12 RX ADMIN — POLYETHYLENE GLYCOL 3350 17 GRAM(S): 17 POWDER, FOR SOLUTION ORAL at 10:49

## 2022-09-12 RX ADMIN — PANTOPRAZOLE SODIUM 40 MILLIGRAM(S): 20 TABLET, DELAYED RELEASE ORAL at 06:24

## 2022-09-12 RX ADMIN — BENZOCAINE AND MENTHOL 1 LOZENGE: 5; 1 LIQUID ORAL at 06:24

## 2022-09-12 RX ADMIN — Medication 650 MILLIGRAM(S): at 22:40

## 2022-09-12 RX ADMIN — LIDOCAINE 1 PATCH: 4 CREAM TOPICAL at 06:24

## 2022-09-12 RX ADMIN — LIDOCAINE 1 PATCH: 4 CREAM TOPICAL at 07:34

## 2022-09-12 RX ADMIN — GABAPENTIN 400 MILLIGRAM(S): 400 CAPSULE ORAL at 13:24

## 2022-09-12 RX ADMIN — Medication 30 MILLILITER(S): at 10:49

## 2022-09-12 RX ADMIN — ENOXAPARIN SODIUM 40 MILLIGRAM(S): 100 INJECTION SUBCUTANEOUS at 22:41

## 2022-09-12 RX ADMIN — Medication 650 MILLIGRAM(S): at 11:38

## 2022-09-12 RX ADMIN — Medication 50 MICROGRAM(S): at 06:24

## 2022-09-12 RX ADMIN — Medication 650 MILLIGRAM(S): at 18:03

## 2022-09-12 RX ADMIN — Medication 2: at 22:51

## 2022-09-12 RX ADMIN — METHOCARBAMOL 750 MILLIGRAM(S): 500 TABLET, FILM COATED ORAL at 13:24

## 2022-09-12 RX ADMIN — LIDOCAINE 1 PATCH: 4 CREAM TOPICAL at 17:41

## 2022-09-12 RX ADMIN — METHOCARBAMOL 500 MILLIGRAM(S): 500 TABLET, FILM COATED ORAL at 06:24

## 2022-09-12 RX ADMIN — Medication 650 MILLIGRAM(S): at 07:25

## 2022-09-12 RX ADMIN — METHOCARBAMOL 750 MILLIGRAM(S): 500 TABLET, FILM COATED ORAL at 22:41

## 2022-09-12 RX ADMIN — INSULIN GLARGINE 8 UNIT(S): 100 INJECTION, SOLUTION SUBCUTANEOUS at 22:51

## 2022-09-12 RX ADMIN — Medication 650 MILLIGRAM(S): at 17:40

## 2022-09-12 RX ADMIN — Medication 2: at 07:52

## 2022-09-12 RX ADMIN — Medication 650 MILLIGRAM(S): at 10:49

## 2022-09-12 RX ADMIN — Medication 650 MILLIGRAM(S): at 06:25

## 2022-09-12 RX ADMIN — GABAPENTIN 600 MILLIGRAM(S): 400 CAPSULE ORAL at 22:41

## 2022-09-12 NOTE — PATIENT PROFILE ADULT - FALL HARM RISK - UNIVERSAL INTERVENTIONS
Bed in lowest position, wheels locked, appropriate side rails in place/Call bell, personal items and telephone in reach/Instruct patient to call for assistance before getting out of bed or chair/Non-slip footwear when patient is out of bed/[place] to call system/Physically safe environment - no spills, clutter or unnecessary equipment/Purposeful Proactive Rounding/Room/bathroom lighting operational, light cord in reach

## 2022-09-12 NOTE — PROGRESS NOTE ADULT - SUBJECTIVE AND OBJECTIVE BOX
Physical Medicine and Rehabilitation Progress Note :     Patient is a 72y old  Female who presents with a chief complaint of Anterior cervical diskectomy and fusion (12 Sep 2022 09:04)      HPI:  72y F with PMH significant for DMII, hypothyroidism, HLD, GERD, presented for elective cervical spine surgery. Patient with chief complaint of neck pain and discomfort with radiation down bilateral upper extremities, right worse than left, onset 10+ years ago, worse in the past year. Reported attempts at conservative management. Patient is right hand dominant. (09 Sep 2022 13:23)                            11.5   14.39 )-----------( 192      ( 11 Sep 2022 08:42 )             35.9       09-11    139  |  105  |  19  ----------------------------<  229<H>  4.4   |  23  |  0.74    Ca    9.0      11 Sep 2022 08:42  Phos  2.6     09-11  Mg     2.2     09-11      Vital Signs Last 24 Hrs  T(C): 36.5 (12 Sep 2022 09:10), Max: 36.8 (11 Sep 2022 17:04)  T(F): 97.7 (12 Sep 2022 09:10), Max: 98.2 (11 Sep 2022 17:04)  HR: 57 (12 Sep 2022 11:30) (53 - 67)  BP: 126/64 (12 Sep 2022 11:30) (116/58 - 158/67)  BP(mean): --  RR: 16 (12 Sep 2022 09:10) (16 - 20)  SpO2: 100% (12 Sep 2022 09:10) (99% - 100%)    Parameters below as of 12 Sep 2022 09:10  Patient On (Oxygen Delivery Method): room air        MEDICATIONS  (STANDING):  acetaminophen     Tablet .. 650 milliGRAM(s) Oral every 6 hours  atorvastatin 10 milliGRAM(s) Oral at bedtime  dextrose 5%. 1000 milliLiter(s) (50 mL/Hr) IV Continuous <Continuous>  dextrose 5%. 1000 milliLiter(s) (100 mL/Hr) IV Continuous <Continuous>  dextrose 50% Injectable 25 Gram(s) IV Push once  dextrose 50% Injectable 12.5 Gram(s) IV Push once  dextrose 50% Injectable 25 Gram(s) IV Push once  enoxaparin Injectable 40 milliGRAM(s) SubCutaneous every 24 hours  gabapentin 600 milliGRAM(s) Oral <User Schedule>  gabapentin 400 milliGRAM(s) Oral <User Schedule>  glucagon  Injectable 1 milliGRAM(s) IntraMuscular once  insulin glargine Injectable (LANTUS) 8 Unit(s) SubCutaneous at bedtime  insulin lispro (ADMELOG) corrective regimen sliding scale   SubCutaneous Before meals and at bedtime  levothyroxine 50 MICROGram(s) Oral daily  lidocaine   4% Patch 1 Patch Transdermal every 24 hours  methocarbamol 750 milliGRAM(s) Oral every 8 hours  pantoprazole    Tablet 40 milliGRAM(s) Oral before breakfast  polyethylene glycol 3350 17 Gram(s) Oral every 12 hours  senna 2 Tablet(s) Oral at bedtime    MEDICATIONS  (PRN):  aluminum hydroxide/magnesium hydroxide/simethicone Suspension 30 milliLiter(s) Oral every 4 hours PRN Dyspepsia  benzocaine 15 mG/menthol 3.6 mG Lozenge 1 Lozenge Oral every 6 hours PRN Sore Throat  dextrose Oral Gel 15 Gram(s) Oral once PRN Blood Glucose LESS THAN 70 milliGRAM(s)/deciliter  ondansetron Injectable 4 milliGRAM(s) IV Push every 6 hours PRN Nausea and/or Vomiting  oxyCODONE    IR 10 milliGRAM(s) Oral every 6 hours PRN Severe Pain (7 - 10)  oxyCODONE    IR 5 milliGRAM(s) Oral every 4 hours PRN Moderate Pain (4 - 6)    Currently Undergoing Physical / Occupational Therapy at bedside    Physical  Therapy Functional Status Assessment :         Pain Assessment/Number Scale (0-10) Adult  Presence of Pain: complains of pain/discomfort  Body Location: BLE + feet  Pain Rating (0-10): Rest: 7   Pain Rating (0-10): Activity: 8     Therapeutic Interventions      Bed Mobility  Bed Mobility Training Rehab Potential: fair, will monitor progress closely  Bed Mobility Training Symptoms Noted During/After Treatment: fatigue;  increased pain  Bed Mobility Training Rolling/Turning: contact guard  Bed Mobility Training Scooting: moderate assist (50% patient effort);  1 person assist  Bed Mobility Training Sit-to-Supine: minimum assist (75% patient effort);  1 person assist;  nonverbal cues (demo/gestures)  Bed Mobility Training Supine-to-Sit: minimum assist (75% patient effort);  1 person assist;  nonverbal cues (demo/gestures);  verbal cues  Bed Mobility Training Limitations: decreased strength;  impaired balance;  pain    Sit-Stand Transfer Training  Sit-to-Stand Transfer Training Rehab Potential: fair, will monitor progress closely  Sit-to-Stand Transfer Training Symptoms Noted During/After Treatment: fatigue;  increased pain  Transfer Training Sit-to-Stand Transfer: moderate assist (50% patient effort);  1 person assist;  nonverbal cues (demo/gestures);  rolling walker  Transfer Training Stand-to-Sit Transfer: moderate assist (50% patient effort);  nonverbal cues (demo/gestures);  1 person assist;  rolling walker  Sit-to-Stand Transfer Training Transfer Safety Analysis: decreased balance;  decreased strength;  impaired balance;  impaired motor control;  impaired postural control;  decreased sensation    Gait Training  Gait Training Rehab Potential: fair, will monitor progress closely  Gait Training Symptoms Noted During/After Treatment: increased pain;  fatigue  Gait Training: moderate assist (50% patient effort);  nonverbal cues (demo/gestures);  1 person assist;  rolling walker;  6 side steps + 3 steps forward/backward  Gait Analysis: 3 LOB posteriorly onto bed;  increased time in double stance;  decreased hip/knee flexion;  retropulsion;  shuffling;  decreased step length;  decreased strength;  impaired balance;  decreased sensation;  pain    Therapeutic Exercise  Therapeutic Exercise Detail: seated therapeutic exercise: B long arc quads, marching, ankle pumps x 10 reps           PM&R Impression : as above    Current Disposition Plan Recommendations :      d/c home, home P.T.

## 2022-09-12 NOTE — PATIENT PROFILE ADULT - FUNCTIONAL ASSESSMENT - BASIC MOBILITY 6.
3-calculated by average/Not able to assess (calculate score using [facility] averaging method)

## 2022-09-12 NOTE — PROGRESS NOTE ADULT - SUBJECTIVE AND OBJECTIVE BOX
HPI:  72y F with PMH significant for DMII, hypothyroidism, HLD, GERD, presented for elective cervical spine surgery. Patient with chief complaint of neck pain and discomfort with radiation down bilateral upper extremities, right worse than left, onset 10+ years ago, worse in the past year. Reported attempts at conservative management. Patient is right hand dominant. (09 Sep 2022 13:23)  9/9: POD0 C6-C7 ACDF with post-operative right shoulder pain, lidocaine patch applied.  9/10: POD#1 s/p C6-C7 ACDF, JANAY overnight, neuro exam stable. DANIELE x1, f/u output, soft collar PRN. Cleared for home no needs by PT/OT.   9/11: POD#2 s/p C6-7 ACDF, JANAY overnight, DANIELE removed yesterday. Complaining of epigastric pain given maalox. Persistent LE radicular type pain, increased gabapentin dosing. Planning for home tomorrow if improved effort with physical therapy. Started on lantus 8 units at bedtime.   9/12: POD3 C6-7 ACDF    OVERNIGHT EVENTS: JANAY  Vital Signs Last 24 Hrs  T(C): 36.6 (11 Sep 2022 21:01), Max: 36.9 (11 Sep 2022 09:05)  T(F): 97.9 (11 Sep 2022 21:01), Max: 98.5 (11 Sep 2022 09:05)  HR: 53 (12 Sep 2022 00:35) (53 - 73)  BP: 136/66 (12 Sep 2022 00:35) (108/61 - 161/75)  BP(mean): --  RR: 18 (12 Sep 2022 00:35) (17 - 20)  SpO2: 100% (12 Sep 2022 00:35) (98% - 100%)    Parameters below as of 12 Sep 2022 00:35  Patient On (Oxygen Delivery Method): room air        I&O's Summary    10 Sep 2022 07:01  -  11 Sep 2022 07:00  --------------------------------------------------------  IN: 0 mL / OUT: 1850 mL / NET: -1850 mL    11 Sep 2022 07:01  -  12 Sep 2022 01:02  --------------------------------------------------------  IN: 0 mL / OUT: 400 mL / NET: -400 mL      PHYSICAL EXAM:  General: NAD, pt is comfortably sitting up in bed, A&O x3, on RA  HEENT: CN II-XII grossly intact, PERRL 3mm, EOMI b/l, face symmetric, tongue midline, neck FROM  Cardiovascular: RRR, normal S1 and S2   Respiratory: lungs CTAB, no wheezing, rhonchi, or crackles   GI: normoactive BS to auscultation, abd soft, NTND   Neuro: no aphasia, speech clear, no dysmetria, no pronator drift  strength 5/5 throughout all 4 extremities  sensation intact to light touch throughout   Extremities: distal pulses 2+ x4   Wound: ACDF incision site C/D/I     LABS:                        11.5   14.39 )-----------( 192      ( 11 Sep 2022 08:42 )             35.9     09-11    139  |  105  |  19  ----------------------------<  229<H>  4.4   |  23  |  0.74    Ca    9.0      11 Sep 2022 08:42  Phos  2.6     09-11  Mg     2.2     09-11    TPro  6.4  /  Alb  4.0  /  TBili  0.2  /  DBili  x   /  AST  23  /  ALT  15  /  AlkPhos  38<L>  09-10            CAPILLARY BLOOD GLUCOSE      POCT Blood Glucose.: 237 mg/dL (11 Sep 2022 21:37)  POCT Blood Glucose.: 226 mg/dL (11 Sep 2022 16:56)  POCT Blood Glucose.: 221 mg/dL (11 Sep 2022 12:19)  POCT Blood Glucose.: 206 mg/dL (11 Sep 2022 07:36)      Drug Levels: [] N/A    CSF Analysis: [] N/A      Allergies    No Known Allergies    Intolerances      MEDICATIONS:  Antibiotics:    Neuro:  acetaminophen     Tablet .. 650 milliGRAM(s) Oral every 6 hours  gabapentin 600 milliGRAM(s) Oral <User Schedule>  gabapentin 400 milliGRAM(s) Oral <User Schedule>  methocarbamol 500 milliGRAM(s) Oral every 8 hours  ondansetron Injectable 4 milliGRAM(s) IV Push every 6 hours PRN  oxyCODONE    IR 10 milliGRAM(s) Oral every 6 hours PRN  oxyCODONE    IR 5 milliGRAM(s) Oral every 4 hours PRN    Anticoagulation:  enoxaparin Injectable 40 milliGRAM(s) SubCutaneous every 24 hours    OTHER:  atorvastatin 10 milliGRAM(s) Oral at bedtime  benzocaine 15 mG/menthol 3.6 mG Lozenge 1 Lozenge Oral every 6 hours PRN  dextrose 50% Injectable 25 Gram(s) IV Push once  dextrose 50% Injectable 12.5 Gram(s) IV Push once  dextrose 50% Injectable 25 Gram(s) IV Push once  dextrose Oral Gel 15 Gram(s) Oral once PRN  glucagon  Injectable 1 milliGRAM(s) IntraMuscular once  insulin glargine Injectable (LANTUS) 8 Unit(s) SubCutaneous at bedtime  insulin lispro (ADMELOG) corrective regimen sliding scale   SubCutaneous Before meals and at bedtime  levothyroxine 50 MICROGram(s) Oral daily  lidocaine   4% Patch 1 Patch Transdermal every 24 hours  pantoprazole    Tablet 40 milliGRAM(s) Oral before breakfast  polyethylene glycol 3350 17 Gram(s) Oral every 12 hours  senna 2 Tablet(s) Oral at bedtime    IVF:  dextrose 5%. 1000 milliLiter(s) IV Continuous <Continuous>  dextrose 5%. 1000 milliLiter(s) IV Continuous <Continuous>    ASSESSMENT:  72y F with PMH significant for DMII, hypothyroidism, HLD, GERD, with neck pain and radiation to bilateral upper extremities R>L now s/p C6-C7 ACDF 9/9/22.     PLAN  NEURO  - Neuro/vitals q4 hours  - DANIELE d/c'd 9/10   - soft collar PRN for comfort  - Decadron 4q6 x 2 days  - Robaxin for muscle spasm  - Continue home gabapentin 400/400/600  - no post op imaging   - lidocaine patch for shoulder PRN     PULM  - RA  - Incentive spirometry     CARDIO  - Normotensive BP goal  - continue atorvastatin 10mg daily     GI  - Diabetic diet  - bowel regimen   - protonix 40mg daily (home med)     RENAL  - no issues     ENDO  - monitor fingersticks   - A1c 7.3, cont ISS    - levothyroxine 50mcg daily  - lantus 8 at bedtime     HEME  - SCDs and SQL 40mg QD for DVT ppx     ID  - no issues    Dispo: Telemetry status, full code, pend home no needs, family updated at bedside.     Assessment and plan discussed with Dr. Casillas

## 2022-09-12 NOTE — PROGRESS NOTE ADULT - ASSESSMENT
72F w DM2 (7.3), hypothyroidism, HLD, GERD p/w neck pain w radiation through BUE R>L, s/p elective C6-C7 ACDF w Dr. Casillas 9/9     #Post-op state - pain controlled. On SCDs. On bowel regimen and incentive spirometer, increase bowel regimen   #NIDDM2 - better controlled, would d/c on home regimen   #HLD - c/w atorva 10  #Hypothyroidism - c/w synthroid 50  #GERD - c/w PPI  #Cervical radiculopathy - mgmt per NSG
per Neurosurgery    72 y o F with PMH significant for DMII, hypothyroidism, HLD, GERD, with neck pain and radiation to bilateral upper extremities R>L now s/p C6-C7 ACDF 9/9/22.     PLAN  NEURO  - Neuro/vitals q4 hours  - DANIELE d/c'd 9/10   - soft collar PRN for comfort  - Decadron 4q6 x 2 days  - Robaxin for muscle spasm  - Continue home gabapentin 400/400/600  - no post op imaging   - lidocaine patch for shoulder PRN     PULM  - RA  - Incentive spirometry     CARDIO  - Normotensive BP goal  - continue atorvastatin 10mg daily     GI  - Diabetic diet  - bowel regimen   - protonix 40mg daily (home med)     RENAL  - no issues     ENDO  - monitor fingersticks   - A1c 7.3, cont ISS    - levothyroxine 50mcg daily  - lantus 8 at bedtime     HEME  - SCDs and SQL 40mg QD for DVT ppx     ID  - no issues

## 2022-09-12 NOTE — PATIENT PROFILE ADULT - HAVE YOU HAD A SECOND COVID-19 BOOSTER?
Sibling Lead elevated  [place] [employer] recommending testing on [place] June  Phone call to mother notified    Will  slip on 4/16/19 for Venous Lead test  Yes

## 2022-09-12 NOTE — PROGRESS NOTE ADULT - SUBJECTIVE AND OBJECTIVE BOX
O/N Events: JANAY  Subjective/ROS: Complaining of abdominal bloating, pain in neck is tolerable. Denies HA, CP, SOB, n/v, changes in bowel/urinary habits.  12pt ROS otherwise negative.    VITALS  Vital Signs Last 24 Hrs  T(C): 36.7 (12 Sep 2022 06:22), Max: 36.8 (11 Sep 2022 17:04)  T(F): 98 (12 Sep 2022 06:22), Max: 98.2 (11 Sep 2022 17:04)  HR: 57 (12 Sep 2022 06:22) (53 - 73)  BP: 158/67 (12 Sep 2022 06:22) (108/61 - 161/75)  BP(mean): --  RR: 19 (12 Sep 2022 06:22) (18 - 20)  SpO2: 99% (12 Sep 2022 06:22) (99% - 100%)    Parameters below as of 12 Sep 2022 06:22  Patient On (Oxygen Delivery Method): room air        I&O's Summary    11 Sep 2022 07:01  -  12 Sep 2022 07:00  --------------------------------------------------------  IN: 0 mL / OUT: 1200 mL / NET: -1200 mL        CAPILLARY BLOOD GLUCOSE      POCT Blood Glucose.: 158 mg/dL (12 Sep 2022 07:39)  POCT Blood Glucose.: 237 mg/dL (11 Sep 2022 21:37)  POCT Blood Glucose.: 226 mg/dL (11 Sep 2022 16:56)  POCT Blood Glucose.: 221 mg/dL (11 Sep 2022 12:19)      PHYSICAL EXAM  General: A&Ox3; NAD  Head: NC/AT; PERRL; EOMI; anicteric sclera  Neck: Supple; no JVD, in cushioned collar  Respiratory: CTA B/L; no wheezes/crackles/rales auscultated w/ good air movement  Cardiovascular: Regular rhythm/rate; S1/S2; no gallops or murmurs auscultated  Gastrointestinal: Soft; NTND w/out rebound tenderness or guarding; bowel sounds normal  Extremities: WWP; no edema or cyanosis; radial/pedal pulses palpable  Neurological:  CNII-XII grossly intact; no obvious focal deficits  Skin: No rashes noted  Vasc: +2 DP/PT pulses b/l   Psych: Appropriate Affect    MEDICATIONS  (STANDING):  acetaminophen     Tablet .. 650 milliGRAM(s) Oral every 6 hours  atorvastatin 10 milliGRAM(s) Oral at bedtime  dextrose 5%. 1000 milliLiter(s) (50 mL/Hr) IV Continuous <Continuous>  dextrose 5%. 1000 milliLiter(s) (100 mL/Hr) IV Continuous <Continuous>  dextrose 50% Injectable 25 Gram(s) IV Push once  dextrose 50% Injectable 12.5 Gram(s) IV Push once  dextrose 50% Injectable 25 Gram(s) IV Push once  enoxaparin Injectable 40 milliGRAM(s) SubCutaneous every 24 hours  gabapentin 600 milliGRAM(s) Oral <User Schedule>  gabapentin 400 milliGRAM(s) Oral <User Schedule>  glucagon  Injectable 1 milliGRAM(s) IntraMuscular once  insulin glargine Injectable (LANTUS) 8 Unit(s) SubCutaneous at bedtime  insulin lispro (ADMELOG) corrective regimen sliding scale   SubCutaneous Before meals and at bedtime  levothyroxine 50 MICROGram(s) Oral daily  lidocaine   4% Patch 1 Patch Transdermal every 24 hours  methocarbamol 750 milliGRAM(s) Oral every 8 hours  pantoprazole    Tablet 40 milliGRAM(s) Oral before breakfast  polyethylene glycol 3350 17 Gram(s) Oral every 12 hours  senna 2 Tablet(s) Oral at bedtime    MEDICATIONS  (PRN):  benzocaine 15 mG/menthol 3.6 mG Lozenge 1 Lozenge Oral every 6 hours PRN Sore Throat  dextrose Oral Gel 15 Gram(s) Oral once PRN Blood Glucose LESS THAN 70 milliGRAM(s)/deciliter  ondansetron Injectable 4 milliGRAM(s) IV Push every 6 hours PRN Nausea and/or Vomiting  oxyCODONE    IR 10 milliGRAM(s) Oral every 6 hours PRN Severe Pain (7 - 10)  oxyCODONE    IR 5 milliGRAM(s) Oral every 4 hours PRN Moderate Pain (4 - 6)      No Known Allergies      LABS                        11.5   14.39 )-----------( 192      ( 11 Sep 2022 08:42 )             35.9     09-11    139  |  105  |  19  ----------------------------<  229<H>  4.4   |  23  |  0.74    Ca    9.0      11 Sep 2022 08:42  Phos  2.6     09-11  Mg     2.2     09-11                IMAGING/EKG/ETC: reviewed

## 2022-09-13 VITALS — DIASTOLIC BLOOD PRESSURE: 79 MMHG | SYSTOLIC BLOOD PRESSURE: 159 MMHG | OXYGEN SATURATION: 100 % | HEART RATE: 60 BPM

## 2022-09-13 LAB
ANION GAP SERPL CALC-SCNC: 10 MMOL/L — SIGNIFICANT CHANGE UP (ref 5–17)
BUN SERPL-MCNC: 12 MG/DL — SIGNIFICANT CHANGE UP (ref 7–23)
CALCIUM SERPL-MCNC: 8.6 MG/DL — SIGNIFICANT CHANGE UP (ref 8.4–10.5)
CHLORIDE SERPL-SCNC: 103 MMOL/L — SIGNIFICANT CHANGE UP (ref 96–108)
CO2 SERPL-SCNC: 25 MMOL/L — SIGNIFICANT CHANGE UP (ref 22–31)
CREAT SERPL-MCNC: 0.71 MG/DL — SIGNIFICANT CHANGE UP (ref 0.5–1.3)
EGFR: 90 ML/MIN/1.73M2 — SIGNIFICANT CHANGE UP
GLUCOSE BLDC GLUCOMTR-MCNC: 110 MG/DL — HIGH (ref 70–99)
GLUCOSE BLDC GLUCOMTR-MCNC: 115 MG/DL — HIGH (ref 70–99)
GLUCOSE SERPL-MCNC: 139 MG/DL — HIGH (ref 70–99)
HCT VFR BLD CALC: 36.8 % — SIGNIFICANT CHANGE UP (ref 34.5–45)
HGB BLD-MCNC: 11.6 G/DL — SIGNIFICANT CHANGE UP (ref 11.5–15.5)
MAGNESIUM SERPL-MCNC: 2.5 MG/DL — SIGNIFICANT CHANGE UP (ref 1.6–2.6)
MCHC RBC-ENTMCNC: 30.1 PG — SIGNIFICANT CHANGE UP (ref 27–34)
MCHC RBC-ENTMCNC: 31.5 GM/DL — LOW (ref 32–36)
MCV RBC AUTO: 95.6 FL — SIGNIFICANT CHANGE UP (ref 80–100)
NRBC # BLD: 0 /100 WBCS — SIGNIFICANT CHANGE UP (ref 0–0)
PHOSPHATE SERPL-MCNC: 2.2 MG/DL — LOW (ref 2.5–4.5)
PLATELET # BLD AUTO: 172 K/UL — SIGNIFICANT CHANGE UP (ref 150–400)
POTASSIUM SERPL-MCNC: 3.9 MMOL/L — SIGNIFICANT CHANGE UP (ref 3.5–5.3)
POTASSIUM SERPL-SCNC: 3.9 MMOL/L — SIGNIFICANT CHANGE UP (ref 3.5–5.3)
RBC # BLD: 3.85 M/UL — SIGNIFICANT CHANGE UP (ref 3.8–5.2)
RBC # FLD: 13.5 % — SIGNIFICANT CHANGE UP (ref 10.3–14.5)
SODIUM SERPL-SCNC: 138 MMOL/L — SIGNIFICANT CHANGE UP (ref 135–145)
WBC # BLD: 9.86 K/UL — SIGNIFICANT CHANGE UP (ref 3.8–10.5)
WBC # FLD AUTO: 9.86 K/UL — SIGNIFICANT CHANGE UP (ref 3.8–10.5)

## 2022-09-13 PROCEDURE — 82962 GLUCOSE BLOOD TEST: CPT

## 2022-09-13 PROCEDURE — 86850 RBC ANTIBODY SCREEN: CPT

## 2022-09-13 PROCEDURE — C1889: CPT

## 2022-09-13 PROCEDURE — 36415 COLL VENOUS BLD VENIPUNCTURE: CPT

## 2022-09-13 PROCEDURE — 85027 COMPLETE CBC AUTOMATED: CPT

## 2022-09-13 PROCEDURE — 80053 COMPREHEN METABOLIC PANEL: CPT

## 2022-09-13 PROCEDURE — 83735 ASSAY OF MAGNESIUM: CPT

## 2022-09-13 PROCEDURE — 97161 PT EVAL LOW COMPLEX 20 MIN: CPT

## 2022-09-13 PROCEDURE — 97530 THERAPEUTIC ACTIVITIES: CPT

## 2022-09-13 PROCEDURE — C1713: CPT

## 2022-09-13 PROCEDURE — 86900 BLOOD TYPING SEROLOGIC ABO: CPT

## 2022-09-13 PROCEDURE — 86803 HEPATITIS C AB TEST: CPT

## 2022-09-13 PROCEDURE — 97162 PT EVAL MOD COMPLEX 30 MIN: CPT

## 2022-09-13 PROCEDURE — 86901 BLOOD TYPING SEROLOGIC RH(D): CPT

## 2022-09-13 PROCEDURE — 76000 FLUOROSCOPY <1 HR PHYS/QHP: CPT

## 2022-09-13 PROCEDURE — 99024 POSTOP FOLLOW-UP VISIT: CPT

## 2022-09-13 PROCEDURE — 80048 BASIC METABOLIC PNL TOTAL CA: CPT

## 2022-09-13 PROCEDURE — 97116 GAIT TRAINING THERAPY: CPT

## 2022-09-13 PROCEDURE — 84100 ASSAY OF PHOSPHORUS: CPT

## 2022-09-13 PROCEDURE — 83036 HEMOGLOBIN GLYCOSYLATED A1C: CPT

## 2022-09-13 PROCEDURE — 81003 URINALYSIS AUTO W/O SCOPE: CPT

## 2022-09-13 RX ORDER — METHOCARBAMOL 500 MG/1
1 TABLET, FILM COATED ORAL
Qty: 42 | Refills: 0
Start: 2022-09-13 | End: 2022-09-26

## 2022-09-13 RX ORDER — GABAPENTIN 400 MG/1
1 CAPSULE ORAL
Qty: 120 | Refills: 0
Start: 2022-09-13 | End: 2022-10-12

## 2022-09-13 RX ORDER — GABAPENTIN 400 MG/1
1 CAPSULE ORAL
Qty: 30 | Refills: 0
Start: 2022-09-13 | End: 2022-10-12

## 2022-09-13 RX ORDER — LIDOCAINE 4 G/100G
1 CREAM TOPICAL
Qty: 7 | Refills: 0
Start: 2022-09-13 | End: 2022-09-19

## 2022-09-13 RX ORDER — GABAPENTIN 400 MG/1
1 CAPSULE ORAL
Qty: 0 | Refills: 0 | DISCHARGE

## 2022-09-13 RX ORDER — GABAPENTIN 400 MG/1
1 CAPSULE ORAL
Qty: 60 | Refills: 0
Start: 2022-09-13 | End: 2022-10-12

## 2022-09-13 RX ORDER — SODIUM,POTASSIUM PHOSPHATES 278-250MG
1 POWDER IN PACKET (EA) ORAL ONCE
Refills: 0 | Status: COMPLETED | OUTPATIENT
Start: 2022-09-13 | End: 2022-09-13

## 2022-09-13 RX ORDER — OXYCODONE HYDROCHLORIDE 5 MG/1
1 TABLET ORAL
Qty: 28 | Refills: 0
Start: 2022-09-13 | End: 2022-09-19

## 2022-09-13 RX ADMIN — METHOCARBAMOL 750 MILLIGRAM(S): 500 TABLET, FILM COATED ORAL at 13:37

## 2022-09-13 RX ADMIN — Medication 650 MILLIGRAM(S): at 12:41

## 2022-09-13 RX ADMIN — Medication 30 MILLILITER(S): at 14:48

## 2022-09-13 RX ADMIN — Medication 1 PACKET(S): at 12:40

## 2022-09-13 RX ADMIN — Medication 650 MILLIGRAM(S): at 05:46

## 2022-09-13 RX ADMIN — Medication 650 MILLIGRAM(S): at 06:46

## 2022-09-13 RX ADMIN — GABAPENTIN 400 MILLIGRAM(S): 400 CAPSULE ORAL at 13:34

## 2022-09-13 RX ADMIN — PANTOPRAZOLE SODIUM 40 MILLIGRAM(S): 20 TABLET, DELAYED RELEASE ORAL at 05:44

## 2022-09-13 RX ADMIN — METHOCARBAMOL 750 MILLIGRAM(S): 500 TABLET, FILM COATED ORAL at 05:42

## 2022-09-13 RX ADMIN — GABAPENTIN 400 MILLIGRAM(S): 400 CAPSULE ORAL at 05:46

## 2022-09-13 RX ADMIN — Medication 50 MICROGRAM(S): at 05:42

## 2022-09-13 RX ADMIN — Medication 650 MILLIGRAM(S): at 13:00

## 2022-09-13 NOTE — PROGRESS NOTE ADULT - SUBJECTIVE AND OBJECTIVE BOX
HPI:  72y F with PMH significant for DMII, hypothyroidism, HLD, GERD, presented for elective cervical spine surgery. Patient with chief complaint of neck pain and discomfort with radiation down bilateral upper extremities, right worse than left, onset 10+ years ago, worse in the past year. Reported attempts at conservative management. Patient is right hand dominant. (09 Sep 2022 13:23)  9/9: POD0 C6-C7 ACDF with post-operative right shoulder pain, lidocaine patch applied.  9/10: POD#1 s/p C6-C7 ACDF, JANAY overnight, neuro exam stable. DANIELE x1, f/u output, soft collar PRN. Cleared for home no needs by PT/OT.   9/11: POD#2 s/p C6-7 ACDF, JANAY overnight, DANIELE removed yesterday. Complaining of epigastric pain given maalox. Persistent LE radicular type pain, increased gabapentin dosing. Planning for home tomorrow if improved effort with physical therapy. Started on lantus 8 units at bedtime.   9/12: POD3 C6-7 ACDF, JANAY, requiring further progress wtih PT    OVERNIGHT EVENTS:JANAY   Vital Signs Last 24 Hrs  T(C): 36.6 (12 Sep 2022 21:16), Max: 36.7 (12 Sep 2022 06:22)  T(F): 97.9 (12 Sep 2022 21:16), Max: 98 (12 Sep 2022 06:22)  HR: 53 (13 Sep 2022 00:10) (53 - 67)  BP: 163/77 (13 Sep 2022 00:10) (120/56 - 163/77)  BP(mean): --  RR: 18 (13 Sep 2022 00:10) (15 - 19)  SpO2: 100% (13 Sep 2022 00:10) (99% - 100%)    Parameters below as of 13 Sep 2022 00:10  Patient On (Oxygen Delivery Method): room air        I&O's Summary    11 Sep 2022 07:01  -  12 Sep 2022 07:00  --------------------------------------------------------  IN: 0 mL / OUT: 1200 mL / NET: -1200 mL    12 Sep 2022 07:01  -  13 Sep 2022 02:24  --------------------------------------------------------  IN: 360 mL / OUT: 800 mL / NET: -440 mL    PHYSICAL EXAM:  General: NAD, pt is comfortably sitting up in bed, A&O x3, on RA  HEENT: CN II-XII grossly intact, PERRL 3mm, EOMI b/l, face symmetric, tongue midline, neck FROM  Cardiovascular: RRR, normal S1 and S2   Respiratory: lungs CTAB, no wheezing, rhonchi, or crackles   GI: normoactive BS to auscultation, abd soft, NTND   Neuro: no aphasia, speech clear, no dysmetria, no pronator drift  strength 5/5 throughout all 4 extremities  sensation intact to light touch throughout   Extremities: distal pulses 2+ x4   Wound: ACDF incision site C/D/I       LABS:                        11.5   14.39 )-----------( 192      ( 11 Sep 2022 08:42 )             35.9     09-11    139  |  105  |  19  ----------------------------<  229<H>  4.4   |  23  |  0.74    Ca    9.0      11 Sep 2022 08:42  Phos  2.6     09-11  Mg     2.2     09-11        Urinalysis Basic - ( 12 Sep 2022 14:37 )    Color: Yellow / Appearance: Clear / SG: <=1.005 / pH: x  Gluc: x / Ketone: NEGATIVE  / Bili: Negative / Urobili: 0.2 E.U./dL   Blood: x / Protein: NEGATIVE mg/dL / Nitrite: NEGATIVE   Leuk Esterase: NEGATIVE / RBC: x / WBC x   Sq Epi: x / Non Sq Epi: x / Bacteria: x          CAPILLARY BLOOD GLUCOSE      POCT Blood Glucose.: 158 mg/dL (12 Sep 2022 22:27)  POCT Blood Glucose.: 147 mg/dL (12 Sep 2022 17:06)  POCT Blood Glucose.: 142 mg/dL (12 Sep 2022 13:01)  POCT Blood Glucose.: 158 mg/dL (12 Sep 2022 07:39)      Drug Levels: [] N/A    CSF Analysis: [] N/A      Allergies    No Known Allergies    Intolerances      MEDICATIONS:  Antibiotics:    Neuro:  acetaminophen     Tablet .. 650 milliGRAM(s) Oral every 6 hours  gabapentin 600 milliGRAM(s) Oral <User Schedule>  gabapentin 400 milliGRAM(s) Oral <User Schedule>  methocarbamol 750 milliGRAM(s) Oral every 8 hours  ondansetron Injectable 4 milliGRAM(s) IV Push every 6 hours PRN  oxyCODONE    IR 10 milliGRAM(s) Oral every 6 hours PRN  oxyCODONE    IR 5 milliGRAM(s) Oral every 4 hours PRN    Anticoagulation:  enoxaparin Injectable 40 milliGRAM(s) SubCutaneous every 24 hours    OTHER:  aluminum hydroxide/magnesium hydroxide/simethicone Suspension 30 milliLiter(s) Oral every 4 hours PRN  atorvastatin 10 milliGRAM(s) Oral at bedtime  benzocaine 15 mG/menthol 3.6 mG Lozenge 1 Lozenge Oral every 6 hours PRN  dextrose 50% Injectable 25 Gram(s) IV Push once  dextrose 50% Injectable 12.5 Gram(s) IV Push once  dextrose 50% Injectable 25 Gram(s) IV Push once  dextrose Oral Gel 15 Gram(s) Oral once PRN  glucagon  Injectable 1 milliGRAM(s) IntraMuscular once  insulin glargine Injectable (LANTUS) 8 Unit(s) SubCutaneous at bedtime  insulin lispro (ADMELOG) corrective regimen sliding scale   SubCutaneous Before meals and at bedtime  levothyroxine 50 MICROGram(s) Oral daily  lidocaine   4% Patch 1 Patch Transdermal every 24 hours  pantoprazole    Tablet 40 milliGRAM(s) Oral before breakfast  polyethylene glycol 3350 17 Gram(s) Oral every 12 hours  senna 2 Tablet(s) Oral at bedtime    IVF:  dextrose 5%. 1000 milliLiter(s) IV Continuous <Continuous>  dextrose 5%. 1000 milliLiter(s) IV Continuous <Continuous>    CULTURES:    ASSESSMENT:  72y F with PMH significant for DMII, hypothyroidism, HLD, GERD, with neck pain and radiation to bilateral upper extremities R>L now s/p C6-C7 ACDF 9/9/22.     PLAN  NEURO  - Neuro/vitals q4 hours  - DANIELE d/c'd 9/10   - soft collar PRN for comfort  - Decadron 4q6 x 2 days  - Robaxin for muscle spasm  - Continue home gabapentin 400/400/600  - no post op imaging   - lidocaine patch for shoulder PRN     PULM  - RA  - Incentive spirometry     CARDIO  - Normotensive BP goal  - continue atorvastatin 10mg daily     GI  - Diabetic diet  - bowel regimen   - protonix 40mg daily (home med)     RENAL  - no issues     ENDO  - monitor fingersticks   - A1c 7.3, cont ISS    - levothyroxine 50mcg daily  - lantus 8 at bedtime     HEME  - SCDs and SQL 40mg QD for DVT ppx     ID  - no issues    Dispo: Telemetry status, full code, pend home no needs, family updated at bedside.     Assessment and plan discussed with Dr. Casillas

## 2022-09-13 NOTE — DISCHARGE NOTE NURSING/CASE MANAGEMENT/SOCIAL WORK - NSDCFUADDAPPT_GEN_ALL_CORE_FT
Please call [phone] to confirm your follow up appointment with Dr. Casillas    Please follow up with your primary care doctor.

## 2022-09-13 NOTE — DISCHARGE NOTE NURSING/CASE MANAGEMENT/SOCIAL WORK - NSDCPEFALRISK_GEN_ALL_CORE
For information on Fall & Injury Prevention, visit: [email or web address] OR  [email or web address] OR  https://www.cdc.gov/steadi/patient.html

## 2022-09-13 NOTE — PROGRESS NOTE ADULT - REASON FOR ADMISSION
Anterior cervical diskectomy and fusion

## 2022-09-13 NOTE — DISCHARGE NOTE NURSING/CASE MANAGEMENT/SOCIAL WORK - PATIENT PORTAL LINK FT
You can access the FollowMyHealth Patient Portal offered by [facility] by registering at the following website: [email or web address]. By joining [facility]’s FollowMyHealth portal, you will also be able to view your health information using other applications (apps) compatible with our system.

## 2022-09-19 DIAGNOSIS — E03.9 HYPOTHYROIDISM, UNSPECIFIED: ICD-10-CM

## 2022-09-19 DIAGNOSIS — E78.5 HYPERLIPIDEMIA, UNSPECIFIED: ICD-10-CM

## 2022-09-19 DIAGNOSIS — M81.0 AGE-RELATED OSTEOPOROSIS WITHOUT CURRENT PATHOLOGICAL FRACTURE: ICD-10-CM

## 2022-09-19 DIAGNOSIS — M47.22 OTHER SPONDYLOSIS WITH RADICULOPATHY, CERVICAL REGION: ICD-10-CM

## 2022-09-19 DIAGNOSIS — Z79.84 LONG TERM (CURRENT) USE OF ORAL HYPOGLYCEMIC DRUGS: ICD-10-CM

## 2022-09-19 DIAGNOSIS — K21.9 GASTRO-ESOPHAGEAL REFLUX DISEASE WITHOUT ESOPHAGITIS: ICD-10-CM

## 2022-09-19 DIAGNOSIS — E11.65 TYPE 2 DIABETES MELLITUS WITH HYPERGLYCEMIA: ICD-10-CM

## 2022-09-19 DIAGNOSIS — M62.838 OTHER MUSCLE SPASM: ICD-10-CM

## 2022-09-19 DIAGNOSIS — M47.12 OTHER SPONDYLOSIS WITH MYELOPATHY, CERVICAL REGION: ICD-10-CM

## 2025-06-01 NOTE — PATIENT PROFILE ADULT - LEGAL HELP
- apply triamcinolone (steroid cream) to affected areas twice a day   - if notice rash is worsening, discontinue steroid cream and start applying clotrimazole (fungal cream) instead  - schedule appointment with dermatology for follow-up, call [phone]   no

## (undated) DEVICE — POSITIONER FOAM EGG CRATE ULNAR 2PCS (PINK)

## (undated) DEVICE — VAGINAL PACKING 2"

## (undated) DEVICE — DRAPE 3/4 SHEET 52X76"

## (undated) DEVICE — SYR LUER LOK 10CC

## (undated) DEVICE — DRAIN RESERVOIR FOR JACKSON PRATT 100CC CARDINAL

## (undated) DEVICE — DRAPE SURGICAL #1010

## (undated) DEVICE — DRAPE LIGHT HANDLE COVER (BLUE)

## (undated) DEVICE — BIPOLAR FORCEP SYMMETRY BAYONET 7" X 1.5MM SMOOTH (SILVER)

## (undated) DEVICE — DRAIN JACKSON PRATT 7MM FLAT FULL NO TROCAR

## (undated) DEVICE — SPONGE SURGICAL STRIP 1/4 X 6"

## (undated) DEVICE — DRAPE 1/2 SHEET 40X57"

## (undated) DEVICE — DRAPE INSTRUMENT POUCH 6.75" X 11"

## (undated) DEVICE — VENODYNE/SCD SLEEVE CALF MEDIUM

## (undated) DEVICE — SPONGE SURGICAL STRIP 1/2 X 6"

## (undated) DEVICE — MARKING PEN W RULER

## (undated) DEVICE — STAPLER SKIN PROXIMATE

## (undated) DEVICE — PACK SPINE

## (undated) DEVICE — NDL SPINAL 18G X 3.5" (PINK)

## (undated) DEVICE — DRAPE C ARM 41X74"

## (undated) DEVICE — MIDAS REX LEGEND BALL FLUTED MEDNEXT SM BORE 4.0MM X 10CM

## (undated) DEVICE — WARMING BLANKET LOWER ADULT

## (undated) DEVICE — SUT VICRYL 3-0 18" SH (POP-OFF)

## (undated) DEVICE — PREP CHLORAPREP HI-LITE ORANGE 26ML

## (undated) DEVICE — ABDOMINAL BINDER MED/LG 9" X 36"-64"

## (undated) DEVICE — DRAPE IOBAN 23" X 23"

## (undated) DEVICE — DRAPE VARI-LENS2 MICROSCPOPE 68MM

## (undated) DEVICE — SPONGE PEANUT AUTO COUNT

## (undated) DEVICE — GLV 7.5 PROTEXIS (WHITE)

## (undated) DEVICE — DRAPE BACK TABLE COVER 80X90"